# Patient Record
Sex: FEMALE | Race: WHITE
[De-identification: names, ages, dates, MRNs, and addresses within clinical notes are randomized per-mention and may not be internally consistent; named-entity substitution may affect disease eponyms.]

---

## 2018-01-10 ENCOUNTER — HOSPITAL ENCOUNTER (INPATIENT)
Dept: HOSPITAL 42 - ED | Age: 58
LOS: 3 days | Discharge: HOME | DRG: 552 | End: 2018-01-13
Attending: INTERNAL MEDICINE | Admitting: INTERNAL MEDICINE
Payer: COMMERCIAL

## 2018-01-10 DIAGNOSIS — F41.9: ICD-10-CM

## 2018-01-10 DIAGNOSIS — K76.0: ICD-10-CM

## 2018-01-10 DIAGNOSIS — G44.209: ICD-10-CM

## 2018-01-10 DIAGNOSIS — M54.81: Primary | ICD-10-CM

## 2018-01-10 DIAGNOSIS — G43.909: ICD-10-CM

## 2018-01-10 DIAGNOSIS — E87.6: ICD-10-CM

## 2018-01-10 DIAGNOSIS — Z90.49: ICD-10-CM

## 2018-01-10 DIAGNOSIS — I65.23: ICD-10-CM

## 2018-01-10 DIAGNOSIS — G89.29: ICD-10-CM

## 2018-01-10 DIAGNOSIS — M79.7: ICD-10-CM

## 2018-01-10 DIAGNOSIS — R55: ICD-10-CM

## 2018-01-10 DIAGNOSIS — R42: ICD-10-CM

## 2018-01-10 DIAGNOSIS — I10: ICD-10-CM

## 2018-01-10 DIAGNOSIS — Z98.1: ICD-10-CM

## 2018-01-10 DIAGNOSIS — Z90.710: ICD-10-CM

## 2018-01-10 DIAGNOSIS — M54.2: ICD-10-CM

## 2018-01-10 DIAGNOSIS — E78.5: ICD-10-CM

## 2018-01-10 DIAGNOSIS — E66.9: ICD-10-CM

## 2018-01-10 LAB
ALBUMIN SERPL-MCNC: 4.6 G/DL (ref 3–4.8)
ALBUMIN/GLOB SERPL: 1.4 {RATIO} (ref 1.1–1.8)
ALT SERPL-CCNC: 71 U/L (ref 7–56)
APPEARANCE UR: (no result)
APTT BLD: 30.8 SECONDS (ref 25.1–36.5)
AST SERPL-CCNC: 60 U/L (ref 14–36)
BACTERIA #/AREA URNS HPF: (no result) /[HPF]
BASOPHILS # BLD AUTO: 0.04 K/MM3 (ref 0–2)
BASOPHILS NFR BLD: 0.6 % (ref 0–3)
BILIRUB UR-MCNC: NEGATIVE MG/DL
BUN SERPL-MCNC: 13 MG/DL (ref 7–21)
CALCIUM SERPL-MCNC: 10.1 MG/DL (ref 8.4–10.5)
CK MB SERPL-MCNC: 0.8 NG/ML (ref 0–3.6)
COLOR UR: YELLOW
EOSINOPHIL # BLD: 0.1 10*3/UL (ref 0–0.7)
EOSINOPHIL NFR BLD: 2 % (ref 1.5–5)
ERYTHROCYTE [DISTWIDTH] IN BLOOD BY AUTOMATED COUNT: 13.8 % (ref 11.5–14.5)
GFR NON-AFRICAN AMERICAN: > 60
GLUCOSE UR STRIP-MCNC: NEGATIVE MG/DL
GRANULOCYTES # BLD: 3.94 10*3/UL (ref 1.4–6.5)
GRANULOCYTES NFR BLD: 61 % (ref 50–68)
HGB BLD-MCNC: 14.3 G/DL (ref 12–16)
INR PPP: 0.99 (ref 0.93–1.08)
LEUKOCYTE ESTERASE UR-ACNC: NEGATIVE LEU/UL
LYMPHOCYTES # BLD: 1.9 10*3/UL (ref 1.2–3.4)
LYMPHOCYTES NFR BLD AUTO: 29.3 % (ref 22–35)
MCH RBC QN AUTO: 32.8 PG (ref 25–35)
MCHC RBC AUTO-ENTMCNC: 33.2 G/DL (ref 31–37)
MCV RBC AUTO: 98.9 FL (ref 80–105)
MONOCYTES # BLD AUTO: 0.5 10*3/UL (ref 0.1–0.6)
MONOCYTES NFR BLD: 7.1 % (ref 1–6)
PH UR STRIP: 7 [PH] (ref 4.7–8)
PLATELET # BLD: 297 10^3/UL (ref 120–450)
PMV BLD AUTO: 9 FL (ref 7–11)
PROT UR STRIP-MCNC: NEGATIVE MG/DL
PROTHROMBIN TIME: 11.4 SECONDS (ref 9.4–12.5)
RBC # BLD AUTO: 4.36 10^6/UL (ref 3.5–6.1)
RBC # UR STRIP: (no result) /UL
SP GR UR STRIP: 1.01 (ref 1–1.03)
TROPONIN I SERPL-MCNC: < 0.01 NG/ML
TROPONIN I SERPL-MCNC: < 0.01 NG/ML
URINE NITRATE: NEGATIVE
UROBILINOGEN UR STRIP-ACNC: 0.2 E.U./DL
WBC # BLD AUTO: 6.5 10^3/UL (ref 4.5–11)
WBC #/AREA URNS HPF: (no result) /HPF (ref 0–6)

## 2018-01-10 RX ADMIN — BUTALBITAL, ACETAMINOPHEN, AND CAFFEINE PRN TAB: 50; 325; 40 TABLET ORAL at 23:00

## 2018-01-10 NOTE — CT
PROCEDURE:  CT HEAD WITHOUT CONTRAST.



HISTORY:

Headache, hypertension 



COMPARISON:

None available. 



TECHNIQUE:

Axial computed tomography images were obtained through the head/brain 

without intravenous contrast.  



Coronal and sagittal reconstructed images.



Radiation dose:



Total exam DLP = 850.76 mGy-cm.



This CT exam was performed using one or more of the following dose 

reduction techniques: Automated exposure control, adjustment of the 

mA and/or kV according to patient size, and/or use of iterative 

reconstruction technique.



FINDINGS:



HEMORRHAGE:

No intracranial hemorrhage. 



BRAIN:

No mass effect or edema.  No atrophy or chronic microvascular 

ischemic changes.



VENTRICLES:

Unremarkable. No hydrocephalus. 



CALVARIUM:

Unremarkable.



PARANASAL SINUSES:

Unremarkable as visualized. No significant inflammatory changes.



MASTOID AIR CELLS:

Unremarkable as visualized. No inflammatory changes.



OTHER FINDINGS:

None.



IMPRESSION:

No acute intracranial abnormalities. No significant findings to 

account for the clinical presentation.

## 2018-01-10 NOTE — CP.PCM.HP
<Joe Finnegan - Last Filed: 01/11/18 05:11>





History of Present Illness





- History of Present Illness


History of Present Illness: 





Chief Complaint: Headaches, generalized weakness


HPI: Patient is a 57 year old female with past medical history of chronic 

migraines, hypotension presenting to [facility] emergency 

department with complaints of generalized weakness, headaches, shortness of 

breath, headaches, and epigastric pain. Patient states that the headache 

started about a month ago and that they are significantly different from the 

migraine she experiences. Patient states when she has migraines they are 

associated with diarrhea vomiting, photophobia and sweating.Patient states 

these headaches are different because they start from the left side of her neck 

and wrap her forehead with a focal point especially above the left eyebrow 

patient states headaches are also associated with her blurry vision and are not 

associated with nausea, diarrhea and photophobia. Pain is rated a seven out of 

ten, exacerbated with laying down and relieved by sitting up.Patient also 

endorses one month of shortness of breath with minimal exertion such as walking 

up the stairs to her house; this has never happened in the past. Patient's 

blood pressure is the main reason she came to the emergency department.  

Patient states her blood pressure has been noticeably increased stating that in 

the past week her readings for systolic blood pressure has been in the 160 to 

180s; this is significant per patient due to the fact that she normally suffers 

from hypotension. While being admitted to the ED patient experienced a syncopal 

episode. Patient did not experience any trauma to this head at this time; code 

star was called. Patient denies fevers, chills, cough. 





PMD: formerly Dr. Lyons


Past surgical history: Cholecystectomy, hysterectomy s/p Stage IV endometriosis

, Fusion L4-L5 , endoscopy by Dr. Humphreys


Family History: Mother- Breast cancer, Father- Myocardial infarction


Past Medical history: chronic fatigue syndrome, chronic migraines, hypotension


Social History: denies smoking, admits to social alcohol consumption, denies 

illicit drug use


Medications: For migraines: Relpax 40 mg as needed, Allergies: esloratidine 5 

mg daily, CFS/ME: valacyclovir 2 mg daily, memantine 5 mg daily, nalyrexone 1 

mg daily, modafenil 100 mg daily, glutathione 50 mg weekly, Pain and 

fibromyalgia: cyclobenzaprine 5 mg nightly, celecoxib 200 mg twice a day, 

Vitamins and supplements: essential blend probiotic, magnesium, multivitamin, 

vitamin D3, Co-Q, phosphatidyl serine, inosine, tumeric curcumin, melatonin





Labs on admission: wbc 6.5, hemoglobin 14.3, hematocrit 43.1, platelets 297, 

sodium 143, potassium 4.1, chloride 101, bicarbonate 31, BUN 13, creatinine 0.7

, AST 60, ALT 71, creatine kinase 263, troponins negative x2


Chest x-ray: no active disease


CT head: no intracranial hemorrhage


MRI brain without contrast: scattered foci of high FLAIR signal intensity 

within the cerebral matter. No mass effect or reestricted diffusion associated 

with these foci; suggestive of chronic small vessel ischemic disease, 

demyelination within differential. Small atrophy of frontal sulci. 

Calcification of posterior right frontal lobe; hemosiderin within differential, 

mild ventriculomegaly, no cerebral edema


Abdominal ultrasound: fatty infiltration of liver, hepatic cysts; largest in 

right lobe 2.3 x 3.1. 











Present on Admission





- Present on Admission


Any Indicators Present on Admission: No





Review of Systems





- Review of Systems


Systems not reviewed;Unavailable: Acuity of Condition





- Constitutional


Constitutional: Headache





- EENT


Eyes: Blurred Vision.  absent: Tunnel Vision





- Cardiovascular


Cardiovascular: Dyspnea on Exertion, Syncope





- Respiratory


Respiratory: Dyspnea.  absent: Wheezing





- Gastrointestinal


Gastrointestinal: Abdominal Pain.  absent: Diarrhea





- Genitourinary


Genitourinary: absent: Dysuria, Flank Pain





- Musculoskeletal


Musculoskeletal: Back Pain





- Neurological


Neurological: Dizziness, Syncope, Weakness





- Endocrine


Endocrine: Fatigue





Past Patient History





- Past Social History


Smoking Status: Never Smoked





- NEUROLOGICAL


Hx Neurological Disorder: Yes


Other/Comment: Fibromyalgia, chronic fatigue





- PSYCHIATRIC


Hx Substance Use: No





- SURGICAL HISTORY


Hx Hysterectomy: Yes


Other/Comment: Rt. Knee, Rt. Shoulder





Meds


Allergies/Adverse Reactions: 


 Allergies











Allergy/AdvReac Type Severity Reaction Status Date / Time


 


No Known Allergies Allergy   Verified 01/10/18 20:53














Physical Exam





- Head Exam


Head Exam: ATRAUMATIC, NORMAL INSPECTION, NORMOCEPHALIC





- Eye Exam


Eye Exam: EOMI, Normal appearance





- ENT Exam


ENT Exam: Mucous Membranes Moist, Normal Exam





- Neck Exam


Neck exam: Positive for: Normal Inspection





- Respiratory Exam


Respiratory Exam: Clear to Auscultation Bilateral, NORMAL BREATHING PATTERN





- Cardiovascular Exam


Cardiovascular Exam: REGULAR RHYTHM, +S1, +S2





- GI/Abdominal Exam


GI & Abdominal Exam: Normal Bowel Sounds, Soft, Tenderness (right upper 

quadrant and right lower quadrant)





- Extremities Exam


Extremities exam: Positive for: normal inspection, pedal edema (bilateral)





- Back Exam


Back exam: NORMAL INSPECTION





- Neurological Exam


Neurological exam: Alert, CN II-XII Intact, Oriented x3





- Psychiatric Exam


Psychiatric exam: Normal Affect, Normal Mood





- Skin


Skin Exam: Intact, Normal Color, Warm





Results





- Vital Signs


Recent Vital Signs: 





 Last Vital Signs











Temp  97.6 F   01/10/18 14:19


 


Pulse  78   01/10/18 14:19


 


Resp  18   01/10/18 14:19


 


BP  149/98 H  01/10/18 14:19


 


Pulse Ox  99   01/10/18 14:19














- Labs


Result Diagrams: 


 01/10/18 13:23





 01/10/18 13:23


Labs: 





 Laboratory Results - last 24 hr











  01/10/18 01/10/18 01/10/18





  13:23 13:23 13:23


 


WBC  6.5  


 


RBC  4.36  


 


Hgb  14.3  


 


Hct  43.1  


 


MCV  98.9  


 


MCH  32.8  


 


MCHC  33.2  


 


RDW  13.8  


 


Plt Count  297  


 


MPV  9.0  


 


Gran %  61.0  


 


Lymph % (Auto)  29.3  


 


Mono % (Auto)  7.1 H  


 


Eos % (Auto)  2.0  


 


Baso % (Auto)  0.6  


 


Gran #  3.94  


 


Lymph #  1.9  


 


Mono #  0.5  


 


Eos #  0.1  


 


Baso #  0.04  


 


PT   11.4 


 


INR   0.99 


 


APTT   30.8 


 


Sodium    143


 


Potassium    4.1


 


Chloride    101


 


Carbon Dioxide    31


 


Anion Gap    15


 


BUN    13


 


Creatinine    0.7


 


Est GFR ( Amer)    > 60


 


Est GFR (Non-Af Amer)    > 60


 


Random Glucose    107


 


Calcium    10.1


 


Total Bilirubin    0.4


 


AST    60 H


 


ALT    71 H


 


Alkaline Phosphatase    104


 


Lactate Dehydrogenase    591


 


Total Creatine Kinase    263 H


 


CK-MB (CK-2)    0.8


 


CK-MB (CK-2) %    Cancelled


 


Troponin I    < 0.01


 


Total Protein    7.7


 


Albumin    4.6


 


Globulin    3.2


 


Albumin/Globulin Ratio    1.4














Assessment & Plan





- Assessment and Plan (Free Text)


Assessment: 





Patient is a 57 year old female with past medical history of chronic migraines, 

hypotension presenting to [facility] emergency department with 

complaints of generalized weakness, headaches, shortness of breath, headaches, 

and epigastric pain.


Plan: 





Syncope r/o ACS versus migraine versus vasovagal versus orthostasis


- Troponin negative x 2


- EKG; normal sinus rhythm 


- carotid doppler


- echocardiogram


- orthostatics


- CBC, CMP, TSH, fasting lipid panel, HgA1C


- Cardiology consult; recommendations appreciated


- Neurology consult; recommendations appreciated


- neuro checks


Migraines


- Fiorocet; continue to monitor


- Regular diet; patient hasn't eaten. Mild stressed with seemster starting again


Hypertension


- Continue to monitor; normalized after fiorocet was administered and eating


Deep vein thrombosis/Gastrointestinal prophylaxis


SCDs and heparin drip/Protonix





<Davis Flynn - Last Filed: 01/12/18 15:47>





Results





- Vital Signs


Recent Vital Signs: 





 Last Vital Signs











Temp  98.8 F   01/11/18 19:06


 


Pulse  105 H  01/11/18 19:06


 


Resp  20   01/11/18 19:06


 


BP  159/101 H  01/11/18 19:06


 


Pulse Ox  98   01/11/18 06:00














- Labs


Result Diagrams: 


 01/12/18 06:30





 01/12/18 06:30


Labs: 





 Laboratory Results - last 24 hr











  01/12/18 01/12/18





  06:30 06:30


 


WBC  12.1 H D 


 


RBC  4.04 


 


Hgb  13.1 


 


Hct  39.3 


 


MCV  97.3 


 


MCH  32.4 


 


MCHC  33.3 


 


RDW  14.0 


 


Plt Count  312 


 


MPV  9.0 


 


Gran %  78.7 H 


 


Lymph % (Auto)  15.6 L 


 


Mono % (Auto)  5.6 


 


Eos % (Auto)  0.0 L 


 


Baso % (Auto)  0.1 


 


Gran #  9.48 H 


 


Lymph #  1.9 


 


Mono #  0.7 H 


 


Eos #  0.0 


 


Baso #  0.01 


 


Sodium   140


 


Potassium   3.9


 


Chloride   107


 


Carbon Dioxide   22


 


Anion Gap   15


 


BUN   13


 


Creatinine   0.6 L


 


Est GFR ( Amer)   > 60


 


Est GFR (Non-Af Amer)   > 60


 


Random Glucose   140 H


 


Calcium   9.4


 


Total Bilirubin   0.3


 


AST   39 H D


 


ALT   55


 


Alkaline Phosphatase   101


 


Total Protein   7.0


 


Albumin   4.0


 


Globulin   3.0


 


Albumin/Globulin Ratio   1.3














Attending/Attestation





- Attestation


I have personally seen and examined this patient.: Yes


I have fully participated in the care of the patient.: Yes


I have reviewed all pertinent clinical information: Yes


Notes (Text): 





01/12/18 15:22





Attending note;





Patient seen and examined with resident in ER.





Patient is a 57 year old female with past medical history of chronic fatigue 

syndrome , history of epidural injections , occipital nerve block, chronic 

migraines who presented with complaint of headache, high blood pressure and   

pre syncopal episode.


CT and MRI brain with significant ischemia.


Echocardiogram normal. Carotid Doppler normal.  Neurology and cardiology 

evaluation appreciated.





Hypertension; not on blood pressure medication at home. Monitor blood pressure 

closely.


Start medication as needed.





continue flexaril and topamax.





Mild transaminitis noted; abdominal ultrasound showed fatty liver.


PT evaluation requested.





Possible discharge home tomorrow if PT clears  the patient.








01/12/18 15:46

## 2018-01-10 NOTE — MRI
EXAM:

  MR Head Without Intravenous Contrast



EXAM DATE/TIME:

  1/10/2018 6:21 PM



CLINICAL HISTORY:

  The patient age is 57 years old and is female; Signs and symptoms; Dizziness 

and other: HA; Additional info: Headache with blurry vision Facility exam id 

and description: Mri br s brain without contrast



TECHNIQUE:

  Magnetic resonance images of the head/brain without intravenous contrast in 

multiple planes.



COMPARISON:

  CT - HEAD W/O CONTRAST 2018-01-10 14:02



FINDINGS:

  Brain:  There is no restricted diffusion within the brain to suggest acute 

ischemic change.  There are scattered foci of high FLAIR signal intensity 

within the cerebral white matter.  There is no mass effect or restricted 

diffusion associated with these foci. This is suggestive of chronic small 

vessel ischemic disease.  Demyelination is within the differential.  There is 

mild atrophy of the frontal sulci.  There is a small focus of magnetic 

susceptibility involving the posterior right frontal lobe, likely representing 

calcification when correlated with the previous CT. Hemosiderin is within the 

differential, but considered less likely. No cerebral edema.

  Ventricles:  There is mild ventriculomegaly.

  Bones/joints:  There is slight anterolisthesis of C3 on C4.

  Sinuses:  Unremarkable as visualized.  No acute sinusitis.

  Mastoid air cells:  No mastoid effusion.

  Orbits:  No acute abnormality, as visualized.



IMPRESSION:     

1.  There is no restricted diffusion within the brain to suggest acute ischemic 

change.

2.  There are scattered foci of high FLAIR signal intensity within the cerebral 

white matter.  This is suggestive of chronic small vessel ischemic disease.  

Demyelination is within the differential.

3.  There is mild atrophy of the frontal sulci.

4.  There is mild ventriculomegaly.

5.  Additional findings described above.

## 2018-01-10 NOTE — ED PDOC
Arrival/HPI





- General


Chief Complaint: High Blood Pressure


Time Seen by Provider: 01/10/18 12:46


Historian: Patient





- History of Present Illness


Narrative History of Present Illness (Text): 





01/10/18 13:49


Patient is a 56 yo female presents to the Emergency Department with history of 

headaches intermittently and "not feeling well" for the past two days but also 

admits "possibly longer than that". Patient notes that she was noted to have 

elevated blood pressure, she denies prior history of this. Patient states 

headaches generalized, not sudden onset. No associated photopobia or neck pain. 

Also states that over past two days she has been short of breath with exertion, 

when she walks up steps she also experiences chest discomfort. At rest, denies 

chest pain. States headache currently persistent but somewhat improved than 

earlier. She reports that blood pressure was "180" prior to arrival. She 

reports that while getting triaged she "almost passed out", reports dizziness 

and lightheadedness while getting registered.


Time/Duration: Prior to Arrival, < week


Symptom Onset: Gradual





Past Medical History





- Neurological


Hx Neurological Disorder: Yes


Other/Comment: Fibromyalgia, chronic fatigue





- Psychiatric


Hx Substance Use: No





- Surgical History


Hx Hysterectomy: Yes


Other/Comment: Rt. Knee, Rt. Shoulder





Family/Social History


Family/Social History: CAD/MI


Smoking Status: Never Smoked


Hx Alcohol Use: No


Hx Substance Use: No





Allergies/Home Meds


Allergies/Adverse Reactions: 


Allergies





No Known Allergies Allergy (Verified 01/10/18 20:53)


 








Home Medications: 


 Home Meds











 Medication  Instructions  Recorded  Confirmed


 


Unobtainable  01/10/18 01/10/18














Review of Systems





- Review of Systems


Constitutional: Fatigue.  absent: Fevers


Eyes: Other (intermittent blurred vision).  absent: Vision Changes, Eye Pain


ENT: absent: Hearing Changes, Sore Throat, Rhinorrhea


Respiratory: absent: SOB


Cardiovascular: Chest Pain, YOUNG.  absent: Edema, Calf Pain


Gastrointestinal: absent: Abdominal Pain, Nausea, Vomiting


Genitourinary Female: absent: Dysuria


Musculoskeletal: Back Pain


Skin: absent: Rash


Neurological: Headache, Dizziness.  absent: Focal Weakness


Endocrine: absent: Polyuria


Hemo/Lymphatic: absent: Easy Bleeding


Psychiatric: absent: Depression





Physical Exam





- Physical Exam


Narrative Physical Exam (Text): 


Head:  Atraumatic.  Normocephalic. 


Eyes:  PERRL.  EOMI.  Conjunctivae are not pale. No pain with eye movements. 

Visual acuity and fields grossly intact.


ENT:  Mucous membranes are moist and intact.  Oropharynx is clear and 

symmetric. 


Neck:  Supple.  Full ROM.  No JVD.  No lymphadenopathy.


Cardiovascular:  Regular rate.  Regular rhythm.  No murmurs, rubs, or gallops.  

Distal pulses are 2+ and symmetric.


Pulmonary/Chest:  No evidence of respiratory distress.  Clear to auscultation 

bilaterally.  No wheezing, rales or rhonchi.


Abdominal:  Soft and non-distended.  Mild epigastric pain, negative Byrd's 

sign.  No rebound, guarding, or rigidity.  No organomegaly.  Good bowel sounds. 


Back:  No CVA tenderness.


Extremities:  No edema.   No cyanosis.  No clubbing.  Full range of motion in 

all extremities.  No calf tenderness.


Skin:  Skin is warm and dry.  No petechiae.  No purpura. 


Neurological:  Alert, awake, and oriented. Motor and sensory exam intact with 

no slurred speech or facial droop. Normal gait. No meningeal signs.


Psychiatric:  Good eye contact.  Normal interaction, affect, and behavior.








01/11/18 11:13





Vital Signs Reviewed: Yes


Vital Signs











  Temp Pulse Pulse Pulse Resp BP Pulse Ox


 


 01/10/18 20:52   83  82  82  19  162/95 H 


 


 01/10/18 20:15  97.6 F  87    20  119/54 L  98


 


 01/10/18 18:10   89    16  155/101 H  98


 


 01/10/18 18:07   86    17  170/109 H  98


 


 01/10/18 14:19  97.6 F  78    18  149/98 H  99


 


 01/10/18 13:53   82    18  146/98 H  99


 


 01/10/18 12:47   99 H    18  149/86  100


 


 01/10/18 11:33  97.7 F  105 H    18  172/117 H  100











Temperature: Afebrile


Blood Pressure: Hypertensive


Appearance: Positive for: Uncomfortable


Pain Distress: Moderate


Mental Status: Positive for: Alert and Oriented X 3


Finger Stick Blood Glucose: 99





Medical Decision Making


ED Course and Treatment: 





01/10/18 16:35


Patient is a 56 yo female, DENIES prior history of hypertension, presents with 

"not feeling well", and patient has had chest pain and dyspnea with exertion.


She has had prior cardiac cath which reportedly was unremarkable. She reports 

significant past family cardiac history. Patient is noted to be hypertensive in 

ED, reportedly found to be more elevated prior to arrival. She reports that her 

"blood pressure is usually low". Her current neuro exam is intact. She reports 

near syncopal event prior to being placed on stretcher. Currently no 

hypotension or tachycardia, no fever. She reports feeling dizzy when she lays 

down. No calf pain or pleuritic pain. There is mild epigastric pain noted on re-

exam, LFTs mildly elevated when compared to prior. Cannot exclude GI component 

of symptoms, although given HTN and chest pain/dyspnea with exertion, possible 

multifactorial source of her symptoms, will admit for cardiac monitoring and 

serial neuro exams. Suspect headaches may be related to hypertension, as 

headache improved with BP improved, although given trending of persistently 

elevated BP by her report over past week will consider underlying neuro 

etiology as well if symptoms persistent.





Case d/w hospitalist, covering for patient's PMD.











- Lab Interpretations


Lab Results: 








 01/10/18 13:23 





 01/10/18 13:23 





 Lab Results





01/10/18 13:23: Sodium 143, Potassium 4.1, Chloride 101, Carbon Dioxide 31, 

Anion Gap 15, BUN 13, Creatinine 0.7, Est GFR (African Amer) > 60, Est GFR (Non-

Af Amer) > 60, Random Glucose 107, Calcium 10.1, Total Bilirubin 0.4, AST 60 H, 

ALT 71 H, Alkaline Phosphatase 104, Lactate Dehydrogenase 591, Total Creatine 

Kinase 263 H, CK-MB (CK-2) 0.8, CK-MB (CK-2) % Cancelled, Troponin I < 0.01, 

Total Protein 7.7, Albumin 4.6, Globulin 3.2, Albumin/Globulin Ratio 1.4


01/10/18 13:23: PT 11.4, INR 0.99, APTT 30.8


01/10/18 13:23: WBC 6.5, RBC 4.36, Hgb 14.3, Hct 43.1, MCV 98.9, MCH 32.8, MCHC 

33.2, RDW 13.8, Plt Count 297, MPV 9.0, Gran % 61.0, Lymph % (Auto) 29.3, Mono 

% (Auto) 7.1 H, Eos % (Auto) 2.0, Baso % (Auto) 0.6, Gran # 3.94, Lymph # 1.9, 

Mono # 0.5, Eos # 0.1, Baso # 0.04


01/10/18 13:21: POC Glucose (mg/dL) 102











- RAD Interpretation


Radiology Orders: 








01/10/18 13:01


HEAD W/O CONTRAST [CT] Stat 


CHEST PORTABLE [RAD] Stat 





01/10/18 14:54


ABDOMEN COMPLETE [US] Stat 











: Radiologist





- EKG Interpretation


EKG Interpretation (Text): 





01/10/18 16:41


EKG normal sinus rhythm rate of 84, moderate voltage criteria for LVH


Interpreted by ED Physician: Yes


Type: 12 lead EKG





- Medication Orders


Current Medication Orders: 








Aspirin (Aspirin Chewable)  81 mg PO DAILY [facility]


Atorvastatin Calcium (Lipitor)  40 mg PO DIN [facility]


Heparin Sodium (Porcine) (Heparin)  5,000 units SC Q12 [facility]


   PRN Reason: Protocol


Sodium Chloride (Sodium Chloride 0.9%)  1,000 mls @ 100 mls/hr IV .Q10H [facility]


Loratadine (Claritin)  10 mg PO DAILY [facility]


   Last Admin: 01/10/18 18:12  Dose: 10 mg





Magnesium Oxide (Mag-Ox)  400 mg PO BID [facility]


Pantoprazole Sodium (Protonix Inj)  40 mg IVP Q12 [facility]


   Last Admin: 01/10/18 22:51  Dose: 40 mg





IVP Administration


 Document     01/10/18 22:51  ST  (Rec: 01/10/18 22:51  ST  [place] [id number])


     Charges for Administration


      # of IVP Administrations                   1





Sodium Chloride (Ocean Nasal Spray)  2 ml NS BID PRN


   PRN Reason: Nasal congestion





Discontinued Medications





Acetaminophen/Butalbital/Caffeine (Fioricet)  1 tab PO ONCE STA


   Stop: 01/10/18 17:04


   Last Admin: 01/10/18 18:12  Dose: 1 tab





MAR Pain Assessment


 Document     01/10/18 18:12  OCS  (Rec: 01/10/18 18:12  OCS  [id number])


     Pain Reassessment


      Is this a pain reassessment?               Yes


     Sleep


      Is patient sleeping during reassessment?   No


     Presence of Pain


      Presence of Pain                           Yes


Re-Assess: MAR Pain Assessment


 Document     01/10/18 19:12  ST  (Rec: 01/10/18 22:56  ST  [place] [id number])


     Pain Reassessment


      Is this a pain reassessment?               Yes


     Sleep


      Is patient sleeping during reassessment?   No


     Description


      Description                                Pressure


      Intensity of Pain at present               2





Acetaminophen/Butalbital/Caffeine (Fioricet)  1 tab PO Q6H PRN


   PRN Reason: Pain, moderate (4-7)


   Last Admin: 01/11/18 06:14  Dose: 1 tab





MAR Pain Assessment


 Document     01/11/18 06:14  ST  (Rec: 01/11/18 06:15  ST  [id number])


     Pain Reassessment


      Is this a pain reassessment?               No


     Sleep


      Is patient sleeping during reassessment?   No


     Presence of Pain


      Presence of Pain                           Yes


     Pain Scale Used


      Pain Scale Used                            Numeric


     Location


      Left, Right or Bilateral                   Left


      Pain Location Body Site                    Head


     Description


      Description                                Constant


      Intensity of Pain at present               9


Re-Assess: MAR Pain Assessment


 Document     01/11/18 07:14  ST  (Rec: 01/11/18 08:23  ST  [id number])


     Pain Reassessment


      Is this a pain reassessment?               Yes


     Sleep


      Is patient sleeping during reassessment?   No


     Presence of Pain


      Presence of Pain                           Yes


     Pain Scale Used


      Pain Scale Used                            Numeric


     Location


      Pain Location Body Site                    Head


     Description


      Description                                Constant


      Intensity of Pain at present               8





Dexamethasone (Decadron Inj)  10 mg IVP ONCE ONE


   Stop: 01/11/18 08:07


   Last Admin: 01/11/18 09:07  Dose: 10 mg





IVP Administration


 Document     01/11/18 09:07  CD  (Rec: 01/11/18 09:07  CD  [id number])


     Charges for Administration


      # of IVP Administrations                   1





Diphenhydramine HCl (Benadryl)  25 mg PO ONCE STA


   Stop: 01/11/18 09:18


   Last Admin: 01/11/18 09:21  Dose: 25 mg





Famotidine (Pepcid)  20 mg IVP STAT [facility]


   Stop: 01/10/18 15:05


   Last Admin: 01/10/18 18:12  Dose: 20 mg





IVP Administration


 Document     01/10/18 18:12  OCS  (Rec: 01/10/18 18:12  OCS  [id number])


     Charges for Administration


      # of IVP Administrations                   1





Valproate Sodium 500 mg/ (Sodium Chloride)  105 mls @ 100 mls/hr IVPB ONCE ONE


   Stop: 01/11/18 08:43


Potassium Chloride (K-Dur 20 Meq Er Tab)  20 meq PO ONCE ONE


   Stop: 01/11/18 09:24











Disposition/Present on Arrival





- Present on Arrival


Any Indicators Present on Arrival: No


History of DVT/PE: No


History of Uncontrolled Diabetes: No


Urinary Catheter: No


History of Decub. Ulcer: No


History Surgical Site Infection Following: None





- Disposition


Have Diagnosis and Disposition been Completed?: Yes


Diagnosis: 


 Hypertension, Dizziness, Headache, Chest pain, Near syncope





Disposition: HOSPITALIZED


Disposition Time: 14:00


Patient Plan: Admission, Telemetry


Patient Problems: 


 Current Active Problems











Problem Status Onset


 


Chest pain Acute  


 


Dizziness Acute  


 


Headache Acute  


 


Hypertension Acute  











Condition: FAIR

## 2018-01-10 NOTE — US
HISTORY:

upper abdominal pain



COMPARISON:

None.



TECHNIQUE:

Sonographic evaluation of the abdomen.



FINDINGS:



LIVER:

Measures 15.9 cm.  Hepatopedal blood flow. Fatty infiltration 

manifest ultrasonographically as increased echogenicity of the liver 

parenchyma. No mass. No intrahepatic bile duct dilatation.Incidental 

finding(s): Hepatic cysts (2) the largest in the right hepatic lobe 

2.3 x 3.1 cm 



GALLBLADDER:

Unremarkable. No gallstones.



COMMON BILE DUCT:

Measures 4.5 mm. No stones. No dilatation.



PANCREAS:

Unremarkable as visualized. No mass. No ductal dilatation.



RIGHT KIDNEY:

Measures cm. Normal echogenicity. No calculus, mass, or 

hydronephrosis.



LEFT KIDNEY:

Measures cm. Normal echogenicity. No calculus, mass, or 

hydronephrosis.



SPLEEN:

Normal in size and contour. No mass.



AORTA:

No aneurysmal dilatation. 



IVC:

Unremarkable. 



OTHER FINDINGS:

None. 



IMPRESSION:

No acute findings related to/accounting  for the clinical 

presentation. Additional benign and/or incidental findings described 

above.

## 2018-01-10 NOTE — RAD
HISTORY:

Syncope



COMPARISON:

08/13/2014. 



FINDINGS:



LUNGS:

No active pulmonary disease.



PLEURA:

No significant pleural effusion identified, no pneumothorax apparent.



CARDIOVASCULAR:

 No radiographic findings to suggest acute or significant 

cardiovascular disease.



OSSEOUS STRUCTURES:

No significant abnormalities.



VISUALIZED UPPER ABDOMEN:

Normal.



OTHER FINDINGS:

None.



IMPRESSION:

No active disease. No significant interval change compared to the 

prior examination(s).

## 2018-01-11 LAB
ALBUMIN SERPL-MCNC: 4.1 G/DL (ref 3–4.8)
ALBUMIN/GLOB SERPL: 1.4 {RATIO} (ref 1.1–1.8)
ALT SERPL-CCNC: 64 U/L (ref 7–56)
AST SERPL-CCNC: 49 U/L (ref 14–36)
BASOPHILS # BLD AUTO: 0.06 K/MM3 (ref 0–2)
BASOPHILS NFR BLD: 0.9 % (ref 0–3)
BUN SERPL-MCNC: 14 MG/DL (ref 7–21)
CALCIUM SERPL-MCNC: 9.3 MG/DL (ref 8.4–10.5)
EOSINOPHIL # BLD: 0.2 10*3/UL (ref 0–0.7)
EOSINOPHIL NFR BLD: 3.1 % (ref 1.5–5)
ERYTHROCYTE [DISTWIDTH] IN BLOOD BY AUTOMATED COUNT: 14.1 % (ref 11.5–14.5)
GFR NON-AFRICAN AMERICAN: > 60
GRANULOCYTES # BLD: 2.64 10*3/UL (ref 1.4–6.5)
GRANULOCYTES NFR BLD: 40.9 % (ref 50–68)
HDLC SERPL-MCNC: 55 MG/DL (ref 29–60)
HGB BLD-MCNC: 13.4 G/DL (ref 12–16)
LDLC SERPL-MCNC: 119 MG/DL (ref 0–129)
LYMPHOCYTES # BLD: 3 10*3/UL (ref 1.2–3.4)
LYMPHOCYTES NFR BLD AUTO: 46.6 % (ref 22–35)
MAGNESIUM SERPL-MCNC: 2 MG/DL (ref 1.7–2.2)
MCH RBC QN AUTO: 32.4 PG (ref 25–35)
MCHC RBC AUTO-ENTMCNC: 32.8 G/DL (ref 31–37)
MCV RBC AUTO: 98.8 FL (ref 80–105)
MONOCYTES # BLD AUTO: 0.6 10*3/UL (ref 0.1–0.6)
MONOCYTES NFR BLD: 8.5 % (ref 1–6)
PLATELET # BLD: 288 10^3/UL (ref 120–450)
PMV BLD AUTO: 9 FL (ref 7–11)
RBC # BLD AUTO: 4.14 10^6/UL (ref 3.5–6.1)
TROPONIN I SERPL-MCNC: < 0.01 NG/ML
WBC # BLD AUTO: 6.5 10^3/UL (ref 4.5–11)

## 2018-01-11 RX ADMIN — PANTOPRAZOLE SODIUM SCH MG: 40 TABLET, DELAYED RELEASE ORAL at 22:32

## 2018-01-11 RX ADMIN — BUTALBITAL, ACETAMINOPHEN, AND CAFFEINE PRN TAB: 50; 325; 40 TABLET ORAL at 06:14

## 2018-01-11 NOTE — CP.PCM.PN
<Joe Finnegan - Last Filed: 01/11/18 13:17>





Subjective





- Date & Time of Evaluation


Date of Evaluation: 01/11/18


Time of Evaluation: 06:30





- Subjective


Subjective: 





Patient seen and examined at bedside in no acute distress. States her headaches 

have returned. Admits to fiorocet and meal improving headache but stating it 

only relieved her headache for an hour. Patient states blurry vision still 

continues despite using her glasses. Admits to dizziness when standing up and 

walking. Has difficulty ambulating on her own. Denies abdominal pain, fevers, 

chills, nausea, vomiting, diarrhea, photophobia. 





Objective





- Vital Signs/Intake and Output


Vital Signs (last 24 hours): 


 











Temp Pulse Resp BP Pulse Ox


 


 97.4 F L  86   20   156/95 H  98 


 


 01/11/18 12:00  01/11/18 12:00  01/11/18 12:00  01/11/18 12:00  01/11/18 06:00








Intake and Output: 


 











 01/11/18 01/11/18





 06:59 18:59


 


Intake Total 300 240


 


Balance 300 240














- Medications


Medications: 


 Current Medications





Aspirin (Aspirin Chewable)  81 mg PO DAILY [facility]


   Last Admin: 01/11/18 12:06 Dose:  81 mg


Atorvastatin Calcium (Lipitor)  40 mg PO DIN [facility]


Heparin Sodium (Porcine) (Heparin)  5,000 units SC Q12 [facility]


   PRN Reason: Protocol


   Last Admin: 01/11/18 12:05 Dose:  5,000 units


Sodium Chloride (Sodium Chloride 0.9%)  1,000 mls @ 100 mls/hr IV .Q10H [facility]


   Last Admin: 01/11/18 12:38 Dose:  100 mls/hr


Loratadine (Claritin)  10 mg PO DAILY [facility]


   Last Admin: 01/11/18 12:06 Dose:  10 mg


Magnesium Oxide (Mag-Ox)  400 mg PO BID [facility]


   Last Admin: 01/11/18 12:06 Dose:  400 mg


Pantoprazole Sodium (Protonix Inj)  40 mg IVP Q12 [facility]


   Last Admin: 01/11/18 12:05 Dose:  40 mg


Sodium Chloride (Ocean Nasal Spray)  2 ml NS BID PRN


   PRN Reason: Nasal congestion











- Labs


Labs: 


 





 01/11/18 06:30 





 01/11/18 06:30 





 











PT  11.4 SECONDS (9.4-12.5)   01/10/18  13:23    


 


INR  0.99  (0.93-1.08)   01/10/18  13:23    


 


APTT  30.8 Seconds (25.1-36.5)   01/10/18  13:23    














- Constitutional


Appears: Non-toxic, No Acute Distress





- Head Exam


Head Exam: ATRAUMATIC, NORMAL INSPECTION, NORMOCEPHALIC





- Eye Exam


Eye Exam: EOMI, Normal appearance





- ENT Exam


ENT Exam: Mucous Membranes Moist, Normal Exam





- Neck Exam


Neck Exam: Normal Inspection





- Respiratory Exam


Respiratory Exam: Clear to Ausculation Bilateral, NORMAL BREATHING PATTERN





- Cardiovascular Exam


Cardiovascular Exam: REGULAR RHYTHM, +S1, +S2





- GI/Abdominal Exam


GI & Abdominal Exam: Soft, Normal Bowel Sounds





- Extremities Exam


Extremities Exam: Pedal Edema





- Neurological Exam


Neurological Exam: Alert, Awake, Oriented x3





- Psychiatric Exam


Psychiatric exam: Normal Affect, Normal Mood





- Skin


Skin Exam: Intact, Normal Color, Warm





Assessment and Plan





- Assessment and Plan (Free Text)


Assessment: 





Patient is a 57 year old female with past medical history of chronic migraines, 

hypotension presenting to [facility] emergency department with 

complaints of generalized weakness, headaches, shortness of breath, headaches, 

and epigastric pain.





Plan: 





Syncope r/o ACS versus migraine versus vasovagal versus orthostasis


- Troponin negative x 3


- EKG; normal sinus rhythm 


- carotid doppler; results pending


- echocardiogram; results pending


- orthostatics; positive. Started on intravenous fluids. 


- CBC, CMP, TSH, fasting lipid panel, HgA1C: liver enzymes are downtrending, 

TSH normal, lipid panel significant for trigylcerides 185, HgA1C


- Cardiology consult; recommendations appreciated


- Neurology consult; MRA head and neck ordered; results pending.Atorvastatin 

and Lipitor started as well considering findings of chronic small ischemic 

vessel changes on MRI brain.


- continue with neuro checks





Migraines


-  Patient given dexamethasone, valproic acid, and magnesium oxide. Discussed 

case with headache specialist Dr. Coyle. Dr. Coyle would like patient to be placed 

on imitrex, flexaril, and topomax. Will see how patient tolerates this 

medication regimen. 


- Regular diet





Hypertension


- Continue to monitor according to headache treatment. If hypertension persists 

will consider anti-hypertensives





Physcial Therapy Evaluation


- Complains of issues ambulating; awaiting physical therapy assessment.





Deep vein thrombosis/Gastrointestinal prophylaxis


SCDs and heparin drip/Protonix








<Pierre Wesley - Last Filed: 01/11/18 16:18>





Objective





- Vital Signs/Intake and Output


Vital Signs (last 24 hours): 


 











Temp Pulse Resp BP Pulse Ox


 


 97.4 F L  101 H  20   156/95 H  98 


 


 01/11/18 12:00  01/11/18 14:00  01/11/18 12:00  01/11/18 12:00  01/11/18 06:00








Intake and Output: 


 











 01/11/18 01/11/18





 06:59 18:59


 


Intake Total 300 240


 


Balance 300 240














- Medications


Medications: 


 Current Medications





Aspirin (Aspirin Chewable)  81 mg PO DAILY [facility]


   Last Admin: 01/11/18 12:06 Dose:  81 mg


Atorvastatin Calcium (Lipitor)  40 mg PO DIN [facility]


Cyclobenzaprine HCl (Flexeril)  5 mg PO TID [facility]


   Last Admin: 01/11/18 15:06 Dose:  5 mg


Heparin Sodium (Porcine) (Heparin)  5,000 units SC Q12 [facility]


   PRN Reason: Protocol


   Last Admin: 01/11/18 12:05 Dose:  5,000 units


Sodium Chloride (Sodium Chloride 0.9%)  1,000 mls @ 100 mls/hr IV .Q10H [facility]


   Last Admin: 01/11/18 12:38 Dose:  100 mls/hr


Loratadine (Claritin)  10 mg PO DAILY [facility]


   Last Admin: 01/11/18 12:06 Dose:  10 mg


Losartan Potassium (Cozaar)  100 mg PO DAILY [facility]


Magnesium Oxide (Mag-Ox)  400 mg PO BID [facility]


Pantoprazole Sodium (Protonix Ec Tab)  40 mg PO Q12 [facility]


Sodium Chloride (Ocean Nasal Spray)  2 ml NS BID PRN


   PRN Reason: Nasal congestion


Topiramate (Topamax)  50 mg PO BID [facility]


   PRN Reason: Protocol


   Last Admin: 01/11/18 15:06 Dose:  50 mg











- Labs


Labs: 


 





 01/11/18 06:30 





 01/11/18 06:30 





 











PT  11.4 SECONDS (9.4-12.5)   01/10/18  13:23    


 


INR  0.99  (0.93-1.08)   01/10/18  13:23    


 


APTT  30.8 Seconds (25.1-36.5)   01/10/18  13:23    














Attending/Attestation





- Attestation


I have personally seen and examined this patient.: Yes


I have fully participated in the care of the patient.: Yes


I have reviewed all pertinent clinical information, including history, physical 

exam and plan: Yes


Notes (Text): 





01/11/18 16:13


57 year old female with past medical history of chronic migraines who presented 

with complaint of headache, high blood pressure and syncopal episode.


CT and MRI brain were reviewed.  Echocardiogram, carotid dopplers and EEG are 

pending.  Neurology and cardiology evaluations were also requested.


She is on aspirin and statin.  She was started on cozaar as well.


Also started on imitrex, flexaril and topamax.  Will check for response.


Mild transaminitis noted; monitor LFTs closely while on statin.


PT evaluation was also requested.





Pierre Wesley MD


Hospitalist.

## 2018-01-11 NOTE — CON
DATE:  01/11/2018



HISTORY:  The patient is a 57-year-old woman who presented with headaches

and general malaise.



She states that she has no history of hypertension in the past, but has

noted elevated blood pressures at home.



She states that there was an episode in which she had a near syncopal

episode with dizziness without loss of consciousness.



Her medications are unclear at home.  However, she was diagnosed in [place]

[place] with chronic fatigue syndrome.



She denies chest pain.  She does complain of intermittent shortness of

breath.



Her cardiac workup included a stress test performed in 2016 which revealed

an EF of 62% with no ischemic changes.



SOCIAL HISTORY:  The patient does not smoke.



REVIEW OF SYSTEMS:  A 14-point review of systems was reviewed.  Chronic

general malaise, dyspnea without edema was noted.  No angina noted.



PHYSICAL EXAMINATION:

VITAL SIGNS:  Blood pressure 156/95, heart rate is in the 80s.

NECK:  Negative JVD.

LUNGS:  Without rales.

HEART:  S1, S2.

EXTREMITIES:  Without edema.



EKG shows normal sinus rhythm with no acute changes.



LABORATORY DATA:  Reveals troponins are negative x3.  BUN and creatinine

are within normal limits.  Her TSH is 3.44.  The hemoglobin is 13.8. 

Preliminary echocardiogram reveals good LV function with no LV outflow

obstruction.



CT scan of the chest revealed no evidence of pneumonia or lung mass.



IMPRESSION:

1.  Fatigue.

2.  Chronic dizziness without loss of consciousness.

3.  Hypertension.

4.  Obesity.

5.  Chronic fatigue syndrome.



Given these findings, there is no evidence for cardiac cause of her

symptoms.  Her carotid ultrasound as well as her abdominal ultrasound is

still pending.



We will discontinue telemetry today.  I have discussed with the patient

about including an exercise program in her life after she is discharged.  I

will begin Diovan to help better control of her blood pressure.  We will

discontinue telemetry today.





__________________________________________

Aric Sarah MD





DD:  01/11/2018 16:10:00

DT:  01/11/2018 16:16:33

Job # [id number]

## 2018-01-11 NOTE — CT
PROCEDURE:  CT Chest without contrast



HISTORY:

hemoptysis



COMPARISON:

None.



TECHNIQUE:

Contiguous axial images were obtained through the chest without 

intravenous contrast enhancement. Sagittal and coronal 

reconstructions were performed.







Radiation dose (DLP): 721 mGy-cm. 



This CT exam was performed using one or more of the following dose 

reduction techniques: Automated exposure control, adjustment of the 

mA and/or kV according to patient size, and/or use of iterative 

reconstruction technique.



FINDINGS:



LUNGS:

There is a 4 x 8 mm density in the right upper lobe. This does not 

have the typical appearance of a nodule and most likely represents an 

area of parenchymal scarring. This is seen on image 54 series 3 



MEDIASTINUM:

Unremarkable thoracic aorta. No aneurysm. Normal sized heart. Main 

pulmonary artery unremarkable. No vascular congestion. No 

lymphadenopathy.



PLEURA:

No pleural fluid. No pneumothorax.



BONES:

No fracture. No destructive lesion. 



UPPER ABDOMEN:

Grossly unremarkable.



OTHER FINDINGS:

None.



IMPRESSION:

Small focal density in the right upper lobe measuring 4 x 8 mm most 

likely representing scar.



There is no evidence of a lung mass or pneumonia

## 2018-01-11 NOTE — MRI
EXAM:

  MR Angiography Neck Without Intravenous Contrast



EXAM DATE/TIME:

  1/11/2018 7:28 AM



CLINICAL HISTORY:

  The patient age is 57 years old and is female; Signs and symptoms; Headache 

and vertigo; Additional info: Follow up mri Facility exam id and description: 

Mri mra necks mra neck without contrast



TECHNIQUE:

  Magnetic resonance angiography images of the neck without intravenous 

contrast.



COMPARISON:

  No relevant prior studies available.



FINDINGS:

  Right common carotid artery:  There is no significant stenosis or occlusion 

of the bilateral distal common carotid arteries. Evaluation of the remaining 

common carotid arteries is otherwise technically suboptimal.

  Right internal carotid artery:  Evaluation of right internal carotid artery 

is technically suboptimal.  No occlusion.

  Right external carotid artery:  No occlusion. Evaluation technically limited.

  Right vertebral artery:  Artifact limits evaluation of the bilateral 

vertebral arteries both proximally and distally. There is no significant 

stenosis or occlusion of the mid vertebral arteries bilaterally.



  Left common carotid artery:  See above.

  Left internal carotid artery:  There is increased tortuosity of the left 

internal carotid artery, without hemodynamically significant stenosis or 

occlusion. Artifact limits evaluation of the proximal left internal carotid 

artery.

  Left external carotid artery:  No occlusion. Evaluation technically limited.

  Left vertebral artery:  See above.





 CAROTID STENOSIS REFERENCE USING NASCET CRITERIA:

  % ICA stenosis = (1 - narrowest ICA diameter/diameter of distal cervical ICA) 

x 100.

  Mild - <50% stenosis.

  Moderate - 50-69% stenosis.

  Severe - 70-94% stenosis.

  Near occlusion - 95-99% stenosis.

  Occluded - 100% stenosis.



IMPRESSION:     

1.  There is increased tortuosity of the left internal carotid artery, without 

hemodynamically significant stenosis or occlusion. 

2.  Evaluation of right internal carotid artery and bilateral common carotid 

arteries are technically suboptimal.  Correlation with CTA or postcontrast MRA 

is recommended.

3.  Artifact also limits evaluation of the bilateral vertebral arteries.

## 2018-01-11 NOTE — MRI
EXAM:

  MR Angiography Head Without Intravenous Contrast



EXAM DATE/TIME:

  1/11/2018 7:27 AM



CLINICAL HISTORY:

  The patient age is 57 years old and is female; Signs and symptoms; Dizziness 

and giddiness and headache; Additional info: Follow up mri, headache Facility 

exam id and description: Mri mra heads mra head without contrast



TECHNIQUE:

  Magnetic resonance angiography images of the head without intravenous 

contrast.



COMPARISON:

  MR - BRAIN WITHOUT CONTRAST 2018-01-10 19:30



FINDINGS:

  Right internal carotid artery:  No acute findings.  Intracranial segment is 

patent with no significant stenosis.  No aneurysm.

  Right anterior cerebral artery:  The A2 segments of the bilateral anterior 

cerebral arteries appear to be duplicated, which is likely due to motion 

artifact. Although a linear band of hypointensity is visualized on the raw data 

in the region of these vessels, dissection is considered less likely.  No 

aneurysm.

  Right middle cerebral artery:  No occlusion or significant stenosis.  No 

aneurysm.

  Right posterior cerebral artery:  No occlusion or significant stenosis.  No 

aneurysm.

  Right vertebral artery:  Not included in the field-of-view of the study.



  Left internal carotid artery:  No acute findings.  Intracranial segment is 

patent with no significant stenosis.  No aneurysm.

  Left anterior cerebral artery:  See above.

  Left middle cerebral artery:  No occlusion or significant stenosis.  No 

aneurysm.

  Left posterior cerebral artery:  There is persistence of the fetal origin of 

the left posterior cerebral artery. There is mild decrease in caliber of this 

vessel proximally, which is either developmental or due to mild stenosis.  No 

aneurysm.

  Left vertebral artery: Suboptimal evaluation.



  Basilar artery:  There is increased tortuosity of the basilar artery.  No 

occlusion or significant stenosis.  No aneurysm.



IMPRESSION:     

1.  There is persistence of the fetal origin of the left posterior cerebral 

artery. There is mild decrease in caliber of this vessel proximally, which is 

either developmental or due to mild stenosis.

2.  The A2 segments of the bilateral anterior cerebral arteries appear to be 

duplicated, which is likely due to motion artifact. Although a linear band of 

hypointensity is visualized on the raw data in the region of these vessels, 

dissection is considered less likely.  This can be further evaluated with CTA.

3.  Additional findings described above.

## 2018-01-11 NOTE — CARD
--------------- APPROVED REPORT --------------





EXAM: Two-dimensional and M-mode echocardiogram with Doppler and 

color Doppler.



INDICATION

Syncope 



2D DIMENSIONS 

Left Atrium (2D)3.8   (1.6-4.0cm)IVSd1.2   (0.7-1.1cm)

Aortic Root (2D)3.1   (2.0-3.7cm)LVDd3.8   (3.9-5.9cm)

PWd1.1   (0.7-1.1cm)LVDs2.1   (2.5-4.0cm)

FS (%) 45.9   %LVEF (%)78.0   (>50%)



M-Mode DIMENSIONS 

Aortic Cusp Exc.1.40   (1.5-2.0cm)



Aortic Valve

AoV Peak Kzvtkzao752.0cm/Kim Peak GR.9mmHg



Mitral Valve

MV E Myblvhlt88.2cm/sMV A Npcanxlk204.0cm/sE/A ratio0.6



TDI

Lateral E' Peak V8.58cm/sMedial E' Peak V4.78cm/sE/Lateral E'7.6

E/Medial E'13.6



Pulmonary Valve

PV Peak Klmmpite839.0cm/sPV Peak Grad.6mmHg



Tricuspid Valve

TR Peak Wbnsacst604kx/sRAP [id number] Peak Gr.16mmHg

[id number]



 LEFT VENTRICLE 

The left ventricle is normal size. There is borderline concentric 

left ventricular hypertrophy. The left ventricular function is 

normal. The left ventricular ejection fraction is within the normal 

range. There is normal LV segmental wall motion. Transmitral Doppler 

flow pattern is Grade I-abnormal relaxation pattern.



 RIGHT VENTRICLE 

The right ventricle is normal size. There is normal right ventricular 

wall thickness. The right ventricular systolic function is normal.



 ATRIA 

The left atrium size is normal. The right atrium size is normal.



 AORTIC VALVE 

The aortic valve is normal in structure. No aortic regurgitation is 

present. There is no aortic valvular stenosis. 



 MITRAL VALVE 

The mitral valve is normal in structure. There is no mitral valve 

regurgitation noted. There is no mitral valve stenosis.



 TRICUSPID VALVE 

The tricuspid valve is normal in structure. There is no tricuspid 

valve regurgitation noted.



 GREAT VESSELS 

The aortic root is normal in size. The IVC was not visualized.



 PERICARDIAL EFFUSION 

There is no pericardial effusion.



<Conclusion>

The left ventricle is normal size.

There is borderline concentric left ventricular hypertrophy.

The left ventricular function is normal.

The left ventricular ejection fraction is within the normal range.

There is normal LV segmental wall motion.

Transmitral Doppler flow pattern is Grade I-abnormal relaxation 

pattern.

## 2018-01-11 NOTE — CARD
--------------- APPROVED REPORT --------------





EKG Measurement

Heart [id number]

DC [id number]

[id number]

[id number]

ATq123



<Conclusion>

*** Poor data quality, interpretation may be adversely affected

Normal sinus rhythm

Voltage criteria for left ventricular hypertrophy

Abnormal ECG

## 2018-01-12 LAB
ALBUMIN SERPL-MCNC: 4 G/DL (ref 3–4.8)
ALBUMIN/GLOB SERPL: 1.3 {RATIO} (ref 1.1–1.8)
ALT SERPL-CCNC: 55 U/L (ref 7–56)
AST SERPL-CCNC: 39 U/L (ref 14–36)
BASOPHILS # BLD AUTO: 0.01 K/MM3 (ref 0–2)
BASOPHILS NFR BLD: 0.1 % (ref 0–3)
BUN SERPL-MCNC: 13 MG/DL (ref 7–21)
CALCIUM SERPL-MCNC: 9.4 MG/DL (ref 8.4–10.5)
EOSINOPHIL # BLD: 0 10*3/UL (ref 0–0.7)
EOSINOPHIL NFR BLD: 0 % (ref 1.5–5)
ERYTHROCYTE [DISTWIDTH] IN BLOOD BY AUTOMATED COUNT: 14 % (ref 11.5–14.5)
GFR NON-AFRICAN AMERICAN: > 60
GRANULOCYTES # BLD: 9.48 10*3/UL (ref 1.4–6.5)
GRANULOCYTES NFR BLD: 78.7 % (ref 50–68)
HGB BLD-MCNC: 13.1 G/DL (ref 12–16)
LYMPHOCYTES # BLD: 1.9 10*3/UL (ref 1.2–3.4)
LYMPHOCYTES NFR BLD AUTO: 15.6 % (ref 22–35)
MCH RBC QN AUTO: 32.4 PG (ref 25–35)
MCHC RBC AUTO-ENTMCNC: 33.3 G/DL (ref 31–37)
MCV RBC AUTO: 97.3 FL (ref 80–105)
MONOCYTES # BLD AUTO: 0.7 10*3/UL (ref 0.1–0.6)
MONOCYTES NFR BLD: 5.6 % (ref 1–6)
PLATELET # BLD: 312 10^3/UL (ref 120–450)
PMV BLD AUTO: 9 FL (ref 7–11)
RBC # BLD AUTO: 4.04 10^6/UL (ref 3.5–6.1)
WBC # BLD AUTO: 12.1 10^3/UL (ref 4.5–11)

## 2018-01-12 RX ADMIN — PANTOPRAZOLE SODIUM SCH MG: 40 TABLET, DELAYED RELEASE ORAL at 22:20

## 2018-01-12 RX ADMIN — PANTOPRAZOLE SODIUM SCH MG: 40 TABLET, DELAYED RELEASE ORAL at 09:11

## 2018-01-12 RX ADMIN — Medication SCH MG: at 18:17

## 2018-01-12 RX ADMIN — Medication SCH MG: at 09:12

## 2018-01-12 NOTE — CP.PCM.CON
Past Patient History





- Past Social History


Smoking Status: Never Smoked





- CARDIAC


Hx Cardiac Disorders: Yes (Hypotension)


Hx Hypertension: Yes


Hx Peripheral Edema: Yes (b/l lower extremity +2,+1 R>L)





- PULMONARY


Hx Respiratory Disorders: No





- NEUROLOGICAL


Hx Neurological Disorder: Yes


Other/Comment: Fibromyalgia, chronic fatigue





- HEENT


Hx HEENT Problems: Yes (Glasses)





- RENAL


Hx Chronic Kidney Disease: No





- ENDOCRINE/METABOLIC


Hx Endocrine Disorders: No





- HEMATOLOGICAL/ONCOLOGICAL


Hx Blood Disorders: No





- INTEGUMENTARY


Hx Dermatological Problems: No





- MUSCULOSKELETAL/RHEUMATOLOGICAL


Hx Falls: Yes


Hx Unsteady Gait: Yes





- GASTROINTESTINAL


Hx Gastroesophageal Reflux: Yes





- GENITOURINARY/GYNECOLOGICAL


Hx Genitourinary Disorders: No





- PSYCHIATRIC


Hx Substance Use: No





- SURGICAL HISTORY


Hx Hysterectomy: Yes


Other/Comment: Rt. Knee, Rt. Shoulder





Meds


Allergies/Adverse Reactions: 


 Allergies











Allergy/AdvReac Type Severity Reaction Status Date / Time


 


No Known Allergies Allergy   Verified 01/10/18 20:53














- Medications


Medications: 


 Current Medications





Acetaminophen (Tylenol 325mg Tab)  650 mg PO Q6H PRN


   PRN Reason: Headache


   Last Admin: 01/12/18 13:11 Dose:  650 mg


Aspirin (Aspirin Chewable)  81 mg PO DAILY [facility]


   Last Admin: 01/12/18 09:11 Dose:  81 mg


Atorvastatin Calcium (Lipitor)  40 mg PO DIN [facility]


Carvedilol (Coreg)  6.25 mg PO BID [facility]


Cyclobenzaprine HCl (Flexeril)  5 mg PO TID [facility]


   Last Admin: 01/12/18 14:38 Dose:  5 mg


Dexamethasone (Decadron)  1 mg PO ONCE@2000 ONE


   Stop: 01/12/18 20:01


Heparin Sodium (Porcine) (Heparin)  5,000 units SC Q12 [facility]


   PRN Reason: Protocol


   Last Admin: 01/12/18 09:12 Dose:  5,000 units


Loratadine (Claritin)  10 mg PO DAILY [facility]


   Last Admin: 01/12/18 09:12 Dose:  10 mg


Lorazepam (Ativan)  0.5 mg PO TID PRN; Protocol


   PRN Reason: Anxiety


Losartan Potassium (Cozaar)  100 mg PO DAILY [facility]


   Last Admin: 01/12/18 09:12 Dose:  100 mg


Magnesium Oxide (Mag-Ox)  400 mg PO BID [facility]


   Last Admin: 01/12/18 09:12 Dose:  400 mg


Pantoprazole Sodium (Protonix Ec Tab)  40 mg PO Q12 [facility]


   Last Admin: 01/12/18 09:11 Dose:  40 mg


Sodium Chloride (Ocean Nasal Spray)  2 ml NS BID PRN


   PRN Reason: Nasal congestion


Topiramate (Topamax)  50 mg PO BID [facility]


   PRN Reason: Protocol


   Last Admin: 01/12/18 13:10 Dose:  50 mg











Results





- Vital Signs


Recent Vital Signs: 


 Last Vital Signs











Temp  98.8 F   01/11/18 19:06


 


Pulse  105 H  01/11/18 19:06


 


Resp  20   01/11/18 19:06


 


BP  159/101 H  01/11/18 19:06


 


Pulse Ox  98   01/11/18 06:00














- Labs


Result Diagrams: 


 01/12/18 06:30





 01/12/18 06:30


Labs: 


 Laboratory Results - last 24 hr











  01/12/18 01/12/18





  06:30 06:30


 


WBC  12.1 H D 


 


RBC  4.04 


 


Hgb  13.1 


 


Hct  39.3 


 


MCV  97.3 


 


MCH  32.4 


 


MCHC  33.3 


 


RDW  14.0 


 


Plt Count  312 


 


MPV  9.0 


 


Gran %  78.7 H 


 


Lymph % (Auto)  15.6 L 


 


Mono % (Auto)  5.6 


 


Eos % (Auto)  0.0 L 


 


Baso % (Auto)  0.1 


 


Gran #  9.48 H 


 


Lymph #  1.9 


 


Mono #  0.7 H 


 


Eos #  0.0 


 


Baso #  0.01 


 


Sodium   140


 


Potassium   3.9


 


Chloride   107


 


Carbon Dioxide   22


 


Anion Gap   15


 


BUN   13


 


Creatinine   0.6 L


 


Est GFR ( Amer)   > 60


 


Est GFR (Non-Af Amer)   > 60


 


Random Glucose   140 H


 


Calcium   9.4


 


Total Bilirubin   0.3


 


AST   39 H D


 


ALT   55


 


Alkaline Phosphatase   101


 


Total Protein   7.0


 


Albumin   4.0


 


Globulin   3.0


 


Albumin/Globulin Ratio   1.3

## 2018-01-12 NOTE — US
PROCEDURE:  Bilateral carotid artery duplex ultrasound 



HISTORY:

Carotid stenosis 



PHYSICIAN(S):  Aric Salas MD.



TECHNIQUE:

Duplex sonography and color-flow Doppler were used to evaluate the 

carotid bifurcations and limited segments of the vertebral arteries 

bilaterally. The exam is limited by tortuous vessels 



FINDINGS:

There is mild smooth hypoechoic plaque noted at the carotid 

bifurcations bilaterally. The peak systolic velocity in the proximal 

right internal carotid artery is 73 cm/sec. This corresponds to a 20 

to 39% proximal right ICA stenosis. Normal systolic velocities are 

noted in the proximal right external carotid artery. There is 

antegrade flow in the right vertebral artery.



The peak systolic velocity in the proximal left internal carotid 

artery is 88 cm/sec. This corresponds to a 20 to 39% proximal left 

ICA stenosis. Normal systolic velocities are noted in the proximal 

left external carotid artery. There is antegrade flow in the left 

vertebral artery.



IMPRESSION:

1. Bilateral 20-39% proximal ICA stenoses.



2. Antegrade flow in both vertebral arteries.

## 2018-01-12 NOTE — PN
DATE:



COVERING FOR:  Aric Sarah MD



SUBJECTIVE:  The patient was ambulated today with physical therapy.  She

did experience dizziness toward the end.  She denies any chest pain.



OBJECTIVE:

VITAL SIGNS:  Blood pressure 159/101, heart rate 105, temperature 98.8, and

respirations 20.  There was no significant postural changes in the blood

pressure.

HEENT:  Normocephalic.

CHEST:  Clear.

HEART:  S1 and S2, regular.

EXTREMITIES:  No edema.



LABORATORY DATA:  Hemoglobin and hematocrit are 13.1 and 39.3.  White count

and platelet counts are 12.1 and 312,000.  Today's SMA-7 is within normal

limits except for glucose of 140 and creatinine of 0.6.  Echocardiogram

revealed a borderline concentric LVH with normal ejection fraction and

normal segmental wall motion, grade 1 abnormal relaxation pattern.  EKG

revealed sinus rhythm with voltage criteria for LVH.  Chest CT scan, a

small focal density in right upper lobe, measuring 4 to 8 mm, most likely

representing scar.  No evidence of lung mass or pneumonia.  Neck MRI,

increased tortuosity of the left internal carotid artery without

hemodynamically significant stenosis or occlusion.  Evaluation of the right

internal carotid artery and bilateral common carotid arteries are

technically suboptimal.  Head MRI, persistent fetal origin of the left

posterior cerebral artery, there is mild decrease of the caliber of this

vessel proximally, which either developmental or due to mild stenosis.  The

A2 segment of the bilateral anterior cerebral arteries appear to be

duplicated, which is likely due to motion artifact, although linear band of

hypodensity is visualized on the _____ in the region of these vessels,

_____ is considered less likely.  This can be further evaluated with CT

angio.  Brain MRI, there is no restricted diffusion within the brain to

suggest acute ischemic changes.  Carotid Doppler. bilateral 20% to 39%

proximal internal carotid artery stenosis.  Antegrade flow in both

vertebral arteries.



ASSESSMENT:

1.  Dizziness.

2.  Hypertension.

3.  Obesity.

4.  Chronic fatigue syndrome.



CONDITIONS:  Continue aspirin 81 mg once a day, Coreg 6.25 mg twice a day,

Cozaar 100 mg once a day, subcutaneous heparin 5000 units twice a day,

Lipitor 40 mg once a day, and further findings of the brain MRI will be

discussed with a neurologist.





__________________________________________

Loi Monroy MD



DD:  01/12/2018 16:17:06

DT:  01/12/2018 18:20:23

[facility] # [id number]

## 2018-01-12 NOTE — CP.PCM.PN
Subjective





- Date & Time of Evaluation


Date of Evaluation: 01/12/18


Time of Evaluation: 07:37





- Subjective


Subjective: 





Ms. Myers was seen and examined at the bedside. She is alert, oriented. She 

states of her headache improved since admission. She denies any blurred vision, 

slurred speech, numbness, weakness, lightheadedness, nausea, or vomiting. She 

further claims of being able to sleep last night in comparison from previous 

night. There was no untoward events overnight.





Objective





- Vital Signs/Intake and Output


Vital Signs (last 24 hours): 


 











Temp Pulse Resp BP Pulse Ox


 


 98.8 F   105 H  20   159/101 H  98 


 


 01/11/18 19:06  01/11/18 19:06  01/11/18 19:06  01/11/18 19:06  01/11/18 06:00








Intake and Output: 


 











 01/12/18 01/12/18





 06:59 18:59


 


Intake Total 720 


 


Balance 720 














- Medications


Medications: 


 Current Medications





Aspirin (Aspirin Chewable)  81 mg PO DAILY [facility]


   Last Admin: 01/11/18 12:06 Dose:  81 mg


Atorvastatin Calcium (Lipitor)  40 mg PO DIN [facility]


Cyclobenzaprine HCl (Flexeril)  5 mg PO TID [facility]


   Last Admin: 01/11/18 15:06 Dose:  5 mg


Heparin Sodium (Porcine) (Heparin)  5,000 units SC Q12 [facility]


   PRN Reason: Protocol


   Last Admin: 01/11/18 22:32 Dose:  5,000 units


Sodium Chloride (Sodium Chloride 0.9%)  1,000 mls @ 100 mls/hr IV .Q10H [facility]


   Last Admin: 01/11/18 12:38 Dose:  100 mls/hr


Loratadine (Claritin)  10 mg PO DAILY [facility]


   Last Admin: 01/11/18 12:06 Dose:  10 mg


Losartan Potassium (Cozaar)  100 mg PO DAILY [facility]


   Last Admin: 01/11/18 19:02 Dose:  100 mg


Magnesium Oxide (Mag-Ox)  400 mg PO BID [facility]


Pantoprazole Sodium (Protonix Ec Tab)  40 mg PO Q12 [facility]


   Last Admin: 01/11/18 22:32 Dose:  40 mg


Sodium Chloride (Ocean Nasal Spray)  2 ml NS BID PRN


   PRN Reason: Nasal congestion


Topiramate (Topamax)  50 mg PO BID [facility]


   PRN Reason: Protocol


   Last Admin: 01/11/18 15:06 Dose:  50 mg











- Labs


Labs: 


 





 01/12/18 06:30 





 01/12/18 06:30 





 











PT  11.4 SECONDS (9.4-12.5)   01/10/18  13:23    


 


INR  0.99  (0.93-1.08)   01/10/18  13:23    


 


APTT  30.8 Seconds (25.1-36.5)   01/10/18  13:23    














- Constitutional


Appears: No Acute Distress





- Head Exam


Head Exam: NORMAL INSPECTION





- ENT Exam


Additional comments: 





pupils 3mm-2mm with light. 








- Neurological Exam


Neurological Exam: Alert, Awake, Oriented x3


Neuro motor strength exam: Left Upper Extremity: 5, Right Upper Extremity: 5, 

Left Lower Extremity: 5, Right Lower Extremity: 5


Additional comments: 





CN 2-12 normal. Speech fluent. memory normal. 








Assessment and Plan


(1) Headache


Assessment & Plan: 


Case discussed with Dr. Ordoñez, continue all current medical therapy. Recommend 

blood pressure control and for patient to return to her pain management 

physician as an outpatient.If there is a change of mental status to do CT of 

the head without contrast.


Status: Acute

## 2018-01-12 NOTE — CP.PCM.CON
History of Present Illness





- History of Present Illness


History of Present Illness: 





   57 yr old woman who presents with syncope and headache, with pmh of migraine 

(once per month), cervical herniated discs multilevel, s/p lumbar fusion and 

chronic neck and back pain, s/p epidural injections.  Miss Myers was initally 

evaluted due to hypertension in the Er, and then she had a syncopal spell, 

where she was walking and suddenly fell. There was no subdural, no seizure, no 

focal neurological deficits. Before and after the fall, she was complaining of 

severe headache, 10/10, with mild phtophobia, radiating from her neck to her 

forehead, sharp and throbbing in nature.  On admission, she was started on 

depakote and decadron, and magnesium, and headache improved. 


    Migraines occur once a month, with no aura, and with nausea, vomiting, and 

dizziness. She says this headache is different in that it is localized to her 

neck and radiates to her forehead. she usually uses relpax and was on topamax 

in the past with good relief, There is no history of adverse effects with 

topamax. 


    SHe follows with a pain management physician who has given her occipital 

nerve block in December and multiple epidiurals in the high cervical region. 

However, she has not had relief.  In fact, she has been advised by neurosurgery 

that she needs cervical fusion at multiple levels but refused. There is mild 

weakness in the extremities and dermatomal decrease in sensation No bowel or 

bladder incontinence. 








PMH/PSH: as above and hyperlipidemia


FH:SH: no smoking, no tobacco. 


All: nkda. 








On exam: 


  AAOX3. Pupils 3mm-2mm with light. EOMI. CN 2-12 normal. Speech fluent. memory 

normal. 


strength: 5/5 ul and ll bl. Sensory: decreased ft, pin in C5/C6 dermatome 

bilaterally. Decreased ROM in lumbar spine. 


+1 brachioradialis, biceps, +2 all other reflxes.Gait normal, no ataxia. ROM 

normal, VFF. no drift, Toes downgoing, 1 beat of clonus. 














Past Patient History





- Past Social History


Smoking Status: Never Smoked





- CARDIAC


Hx Cardiac Disorders: Yes (Hypotension)


Hx Hypertension: Yes


Hx Peripheral Edema: Yes (b/l lower extremity +2,+1 R>L)





- PULMONARY


Hx Respiratory Disorders: No





- NEUROLOGICAL


Hx Neurological Disorder: Yes


Other/Comment: Fibromyalgia, chronic fatigue





- HEENT


Hx HEENT Problems: Yes (Glasses)





- RENAL


Hx Chronic Kidney Disease: No





- ENDOCRINE/METABOLIC


Hx Endocrine Disorders: No





- HEMATOLOGICAL/ONCOLOGICAL


Hx Blood Disorders: No





- INTEGUMENTARY


Hx Dermatological Problems: No





- MUSCULOSKELETAL/RHEUMATOLOGICAL


Hx Falls: Yes


Hx Unsteady Gait: Yes





- GASTROINTESTINAL


Hx Gastroesophageal Reflux: Yes





- GENITOURINARY/GYNECOLOGICAL


Hx Genitourinary Disorders: No





- PSYCHIATRIC


Hx Substance Use: No





- SURGICAL HISTORY


Hx Hysterectomy: Yes


Other/Comment: Rt. Knee, Rt. Shoulder





Meds


Allergies/Adverse Reactions: 


 Allergies











Allergy/AdvReac Type Severity Reaction Status Date / Time


 


No Known Allergies Allergy   Verified 01/10/18 20:53














- Medications


Medications: 


 Current Medications





Aspirin (Aspirin Chewable)  81 mg PO DAILY [facility]


   Last Admin: 01/11/18 12:06 Dose:  81 mg


Atorvastatin Calcium (Lipitor)  40 mg PO DIN [facility]


Heparin Sodium (Porcine) (Heparin)  5,000 units SC Q12 [facility]


   PRN Reason: Protocol


   Last Admin: 01/11/18 12:05 Dose:  5,000 units


Sodium Chloride (Sodium Chloride 0.9%)  1,000 mls @ 100 mls/hr IV .Q10H [facility]


   Last Admin: 01/11/18 12:38 Dose:  100 mls/hr


Loratadine (Claritin)  10 mg PO DAILY [facility]


   Last Admin: 01/11/18 12:06 Dose:  10 mg


Magnesium Oxide (Mag-Ox)  400 mg PO BID [facility]


   Last Admin: 01/11/18 12:06 Dose:  400 mg


Pantoprazole Sodium (Protonix Inj)  40 mg IVP Q12 [facility]


   Last Admin: 01/11/18 12:05 Dose:  40 mg


Sodium Chloride (Ocean Nasal Spray)  2 ml NS BID PRN


   PRN Reason: Nasal congestion











Results





- Vital Signs


Recent Vital Signs: 


 Last Vital Signs











Temp  97.4 F L  01/11/18 12:00


 


Pulse  86   01/11/18 12:00


 


Resp  20   01/11/18 12:00


 


BP  156/95 H  01/11/18 12:00


 


Pulse Ox  98   01/11/18 06:00














- Labs


Result Diagrams: 


 01/11/18 06:30





 01/11/18 06:30


Labs: 


 Laboratory Results - last 24 hr











  01/10/18 01/10/18 01/11/18





  18:15 21:51 06:30


 


WBC    6.5


 


RBC    4.14


 


Hgb    13.4


 


Hct    40.9


 


MCV    98.8


 


MCH    32.4


 


MCHC    32.8


 


RDW    14.1


 


Plt Count    288


 


MPV    9.0


 


Gran %    40.9 L


 


Lymph % (Auto)    46.6 H


 


Mono % (Auto)    8.5 H


 


Eos % (Auto)    3.1


 


Baso % (Auto)    0.9


 


Gran #    2.64


 


Lymph #    3.0


 


Mono #    0.6


 


Eos #    0.2


 


Baso #    0.06


 


Sodium   


 


Potassium   


 


Chloride   


 


Carbon Dioxide   


 


Anion Gap   


 


BUN   


 


Creatinine   


 


Est GFR ( Amer)   


 


Est GFR (Non-Af Amer)   


 


Random Glucose   


 


Hemoglobin A1c   


 


Calcium   


 


Phosphorus   


 


Magnesium   


 


Total Bilirubin   


 


AST   


 


ALT   


 


Alkaline Phosphatase   


 


Lactate Dehydrogenase   589 


 


Total Creatine Kinase   211 


 


Troponin I   < 0.01 


 


NT-Pro-B Natriuret Pep   


 


Total Protein   


 


Albumin   


 


Globulin   


 


Albumin/Globulin Ratio   


 


Triglycerides   


 


Cholesterol   


 


LDL Cholesterol Direct   


 


HDL Cholesterol   


 


TSH 3rd Generation   


 


Urine Color  Yellow  


 


Urine Appearance  Cloudy  


 


Urine pH  7.0  


 


Ur Specific Gravity  1.015  


 


Urine Protein  Negative  


 


Urine Glucose (UA)  Negative  


 


Urine Ketones  Negative  


 


Urine Blood  Trace-intact H  


 


Urine Nitrate  Negative  


 


Urine Bilirubin  Negative  


 


Urine Urobilinogen  0.2  


 


Ur Leukocyte Esterase  Negative  


 


Urine RBC  1 - 3  


 


Urine WBC  0 - 2  


 


Ur Epithelial Cells  6 - 8  


 


Urine Bacteria  Mod  














  01/11/18 01/11/18 01/11/18





  06:30 06:30 06:30


 


WBC   


 


RBC   


 


Hgb   


 


Hct   


 


MCV   


 


MCH   


 


MCHC   


 


RDW   


 


Plt Count   


 


MPV   


 


Gran %   


 


Lymph % (Auto)   


 


Mono % (Auto)   


 


Eos % (Auto)   


 


Baso % (Auto)   


 


Gran #   


 


Lymph #   


 


Mono #   


 


Eos #   


 


Baso #   


 


Sodium  141  


 


Potassium  3.5 L  


 


Chloride  101  


 


Carbon Dioxide  28  


 


Anion Gap  16  


 


BUN  14  


 


Creatinine  0.9  


 


Est GFR ( Amer)  > 60  


 


Est GFR (Non-Af Amer)  > 60  


 


Random Glucose  125 H  


 


Hemoglobin A1c   6.2 


 


Calcium  9.3  


 


Phosphorus  4.2  


 


Magnesium  2.0  


 


Total Bilirubin  0.2  


 


AST  49 H  


 


ALT  64 H  


 


Alkaline Phosphatase  106  


 


Lactate Dehydrogenase  456  


 


Total Creatine Kinase  172  


 


Troponin I  < 0.01  


 


NT-Pro-B Natriuret Pep   


 


Total Protein  7.0  


 


Albumin  4.1  


 


Globulin  2.8  


 


Albumin/Globulin Ratio  1.4  


 


Triglycerides  185 H  


 


Cholesterol  194  


 


LDL Cholesterol Direct  119  


 


HDL Cholesterol  55  


 


TSH 3rd Generation    3.44


 


Urine Color   


 


Urine Appearance   


 


Urine pH   


 


Ur Specific Gravity   


 


Urine Protein   


 


Urine Glucose (UA)   


 


Urine Ketones   


 


Urine Blood   


 


Urine Nitrate   


 


Urine Bilirubin   


 


Urine Urobilinogen   


 


Ur Leukocyte Esterase   


 


Urine RBC   


 


Urine WBC   


 


Ur Epithelial Cells   


 


Urine Bacteria   














  01/11/18





  09:45


 


WBC 


 


RBC 


 


Hgb 


 


Hct 


 


MCV 


 


MCH 


 


MCHC 


 


RDW 


 


Plt Count 


 


MPV 


 


Gran % 


 


Lymph % (Auto) 


 


Mono % (Auto) 


 


Eos % (Auto) 


 


Baso % (Auto) 


 


Gran # 


 


Lymph # 


 


Mono # 


 


Eos # 


 


Baso # 


 


Sodium 


 


Potassium 


 


Chloride 


 


Carbon Dioxide 


 


Anion Gap 


 


BUN 


 


Creatinine 


 


Est GFR ( Amer) 


 


Est GFR (Non-Af Amer) 


 


Random Glucose 


 


Hemoglobin A1c 


 


Calcium 


 


Phosphorus 


 


Magnesium 


 


Total Bilirubin 


 


AST 


 


ALT 


 


Alkaline Phosphatase 


 


Lactate Dehydrogenase 


 


Total Creatine Kinase 


 


Troponin I 


 


NT-Pro-B Natriuret Pep  29.1


 


Total Protein 


 


Albumin 


 


Globulin 


 


Albumin/Globulin Ratio 


 


Triglycerides 


 


Cholesterol 


 


LDL Cholesterol Direct 


 


HDL Cholesterol 


 


TSH 3rd Generation 


 


Urine Color 


 


Urine Appearance 


 


Urine pH 


 


Ur Specific Gravity 


 


Urine Protein 


 


Urine Glucose (UA) 


 


Urine Ketones 


 


Urine Blood 


 


Urine Nitrate 


 


Urine Bilirubin 


 


Urine Urobilinogen 


 


Ur Leukocyte Esterase 


 


Urine RBC 


 


Urine WBC 


 


Ur Epithelial Cells 


 


Urine Bacteria 














Assessment & Plan





- Assessment and Plan (Free Text)


Assessment: 





57 yr old woman with migraine and severe cervical spasm, as well as occipital 

neuralgia, and tension headache. She will need weekly occipital nerve blocks 

and TPI injections from pain management. 





Plan: 





1. Topamax 50 mg bid 


2. Imitrex 100 mg po now


3. Refer back to pain managemnt. 


4. Blood pressure control. 


5. flexaril 5 mg po daily

## 2018-01-12 NOTE — CARD
--------------- APPROVED REPORT --------------





EKG Measurement

Heart [id number]

CA [id number]

[id number]

[id number]

[id number]



<Conclusion>

Normal sinus rhythm

Moderate voltage criteria for LVH, may be normal variant

Borderline ECG

## 2018-01-13 VITALS — DIASTOLIC BLOOD PRESSURE: 92 MMHG | SYSTOLIC BLOOD PRESSURE: 142 MMHG | HEART RATE: 87 BPM

## 2018-01-13 VITALS — RESPIRATION RATE: 20 BRPM | OXYGEN SATURATION: 97 % | TEMPERATURE: 97.6 F

## 2018-01-13 LAB
ALBUMIN SERPL-MCNC: 3.8 G/DL (ref 3–4.8)
ALBUMIN/GLOB SERPL: 1.4 {RATIO} (ref 1.1–1.8)
ALT SERPL-CCNC: 50 U/L (ref 7–56)
AST SERPL-CCNC: 33 U/L (ref 14–36)
BASOPHILS # BLD AUTO: 0.03 K/MM3 (ref 0–2)
BASOPHILS NFR BLD: 0.4 % (ref 0–3)
BUN SERPL-MCNC: 14 MG/DL (ref 7–21)
CALCIUM SERPL-MCNC: 8.7 MG/DL (ref 8.4–10.5)
EOSINOPHIL # BLD: 0 10*3/UL (ref 0–0.7)
EOSINOPHIL NFR BLD: 0.5 % (ref 1.5–5)
ERYTHROCYTE [DISTWIDTH] IN BLOOD BY AUTOMATED COUNT: 14.3 % (ref 11.5–14.5)
GFR NON-AFRICAN AMERICAN: > 60
GRANULOCYTES # BLD: 4.99 10*3/UL (ref 1.4–6.5)
GRANULOCYTES NFR BLD: 64.1 % (ref 50–68)
HGB BLD-MCNC: 12.6 G/DL (ref 12–16)
LYMPHOCYTES # BLD: 2.2 10*3/UL (ref 1.2–3.4)
LYMPHOCYTES NFR BLD AUTO: 28.7 % (ref 22–35)
MCH RBC QN AUTO: 31.9 PG (ref 25–35)
MCHC RBC AUTO-ENTMCNC: 32.6 G/DL (ref 31–37)
MCV RBC AUTO: 98 FL (ref 80–105)
MONOCYTES # BLD AUTO: 0.5 10*3/UL (ref 0.1–0.6)
MONOCYTES NFR BLD: 6.3 % (ref 1–6)
PLATELET # BLD: 288 10^3/UL (ref 120–450)
PMV BLD AUTO: 8.9 FL (ref 7–11)
RBC # BLD AUTO: 3.95 10^6/UL (ref 3.5–6.1)
WBC # BLD AUTO: 7.8 10^3/UL (ref 4.5–11)

## 2018-01-13 RX ADMIN — PANTOPRAZOLE SODIUM SCH MG: 40 TABLET, DELAYED RELEASE ORAL at 09:17

## 2018-01-13 RX ADMIN — Medication SCH MG: at 09:18

## 2018-01-13 NOTE — CP.PCM.PN
Addendum entered and electronically signed by Joe Finnegan DO  01/13/18 10

:46: 





typing error made: originally states in plan atorvastatin and lipitor were 

started due to brain MRI findings however this should read aspirin and Lipitor 

started due to brain MRI findings, not atorvastatin and lipitor.





Original Note:








<Joe Finnegan - Last Filed: 01/13/18 05:19>





Subjective





- Date & Time of Evaluation


Date of Evaluation: 01/12/18


Time of Evaluation: 09:00





- Subjective


Subjective: 





Patient seen and examined at bedside in no acute distress. States she feels 

fine as the headache medication is working however her new concern is feeling 

dizzy when she gets up to walk. Endorses shortness of breath, dizziness. Denies 

nausea, vomiting, diarrhea, abdominal pain. 





Objective





- Vital Signs/Intake and Output


Vital Signs (last 24 hours): 


 











Temp Pulse Resp BP Pulse Ox


 


 98 F   68   18   123/68   98 


 


 01/12/18 18:06  01/12/18 18:20  01/12/18 18:06  01/12/18 18:20  01/12/18 18:06








Intake and Output: 


 











 01/12/18 01/13/18





 18:59 06:59


 


Intake Total  960


 


Balance  960














- Medications


Medications: 


 Current Medications





Acetaminophen (Tylenol 325mg Tab)  650 mg PO Q6H PRN


   PRN Reason: Headache


   Last Admin: 01/12/18 13:11 Dose:  650 mg


Aspirin (Aspirin Chewable)  81 mg PO DAILY [facility]


   Last Admin: 01/12/18 09:11 Dose:  81 mg


Atorvastatin Calcium (Lipitor)  40 mg PO DIN [facility]


   Last Admin: 01/12/18 18:24 Dose:  40 mg


Carvedilol (Coreg)  6.25 mg PO BID [facility]


   Last Admin: 01/12/18 18:20 Dose:  6.25 mg


Cyclobenzaprine HCl (Flexeril)  5 mg PO TID [facility]


   Last Admin: 01/12/18 18:24 Dose:  5 mg


Heparin Sodium (Porcine) (Heparin)  5,000 units SC Q12 [facility]


   PRN Reason: Protocol


   Last Admin: 01/12/18 09:12 Dose:  5,000 units


Loratadine (Claritin)  10 mg PO DAILY [facility]


   Last Admin: 01/12/18 09:12 Dose:  10 mg


Lorazepam (Ativan)  0.5 mg PO TID PRN; Protocol


   PRN Reason: Anxiety


Losartan Potassium (Cozaar)  50 mg PO DAILY [facility]


Magnesium Oxide (Mag-Ox)  400 mg PO BID [facility]


   Last Admin: 01/12/18 18:17 Dose:  400 mg


Pantoprazole Sodium (Protonix Ec Tab)  40 mg PO Q12 [facility]


   Last Admin: 01/12/18 09:11 Dose:  40 mg


Sodium Chloride (Ocean Nasal Spray)  2 ml NS BID PRN


   PRN Reason: Nasal congestion


Topiramate (Topamax)  50 mg PO BID [facility]


   PRN Reason: Protocol


   Last Admin: 01/12/18 18:17 Dose:  50 mg











- Labs


Labs: 


 





 01/12/18 06:30 





 01/12/18 06:30 





 











PT  11.4 SECONDS (9.4-12.5)   01/10/18  13:23    


 


INR  0.99  (0.93-1.08)   01/10/18  13:23    


 


APTT  30.8 Seconds (25.1-36.5)   01/10/18  13:23    














- Constitutional


Appears: Non-toxic, No Acute Distress





- Head Exam


Head Exam: ATRAUMATIC, NORMAL INSPECTION, NORMOCEPHALIC





- Eye Exam


Eye Exam: EOMI





- ENT Exam


ENT Exam: Mucous Membranes Moist





- Neck Exam


Neck Exam: Normal Inspection





- Respiratory Exam


Respiratory Exam: Clear to Ausculation Bilateral, NORMAL BREATHING PATTERN.  

absent: Rhonchi, Wheezes





- Cardiovascular Exam


Cardiovascular Exam: Tachycardia, REGULAR RHYTHM, +S1, +S2





- GI/Abdominal Exam


GI & Abdominal Exam: Soft, Normal Bowel Sounds





- Back Exam


Back Exam: NORMAL INSPECTION





- Neurological Exam


Neurological Exam: Alert, Awake, Oriented x3





- Psychiatric Exam


Psychiatric exam: Anxious, Normal Affect





- Skin


Skin Exam: Intact, Normal Color, Warm





Assessment and Plan





- Assessment and Plan (Free Text)


Assessment: 








Patient is a 57 year old female with past medical history of chronic migraines, 

hypotension presenting to [facility] emergency department with 

complaints of generalized weakness, headaches, shortness of breath, headaches, 

and epigastric pain.











Plan: 





Syncope r/o ACS versus migraine versus vasovagal versus orthostasis


- Troponin negative x 3


- EKG; normal sinus rhythm 


- carotid doppler; bilateral 20-39% proximal ICA stenosis


- echocardiogram; borderline concentric left ventricular hypertophy, aside from 

this finding there are no other abnormalities


- CT chest: no evidence of pneumonia or mass


- orthostatics; positive. Started on intravenous fluids. 


- CBC, CMP, TSH, fasting lipid panel, HgA1C: liver enzymes are downtrending, 

TSH normal, lipid panel significant for trigylcerides 185, HgA1C


- Cardiology consult; recommendations appreciated


- Neurology consult; MRA head reveals possible mild stenosis of left posterior 

cerebral artery and MRA neck reveals mild stenosis of left internal carotid 

artery which correlates with carotid doppler findings. Atorvastatin and Lipitor 

started as well considering findings of chronic small ischemic vessel changes 

on MRI brain.


- continue with neuro checks





Migraines


-  Patient given dexamethasone, valproic acid, and magnesium oxide. Discussed 

case with headache specialist Dr. Coyle. Dr. Coyle would like patient to be placed 

on imitrex, flexaril, and topomax. Will see how patient tolerates this 

medication regimen. 


- Regular diet





Hypertension


- Continue to monitor according to headache treatment. If hypertension persists 

will consider anti-hypertensives


- Nephrologt consulted; recommendations appreciated; dexamethasone suppression 

test to rule out Cushing's. 





Physcial Therapy Evaluation


- Complains of issues ambulating; awaiting physical therapy assessment.





Deep vein thrombosis/Gastrointestinal prophylaxis


SCDs and heparin drip/Protonix





Case discussed and reviewed with Dr. Gee Finnegan PGY1





<Davis Flynn - Last Filed: 01/13/18 15:16>





Objective





- Vital Signs/Intake and Output


Vital Signs (last 24 hours): 


 











Temp Pulse Resp BP Pulse Ox


 


 97.6 F   87   20   142/92 H  97 


 


 01/13/18 08:43  01/13/18 09:17  01/13/18 08:43  01/13/18 09:17  01/13/18 08:43








Intake and Output: 


 











 01/13/18 01/13/18





 06:59 18:59


 


Intake Total 960 


 


Balance 960 














- Labs


Labs: 


 





 01/13/18 07:30 





 01/13/18 07:30 





 











PT  11.4 SECONDS (9.4-12.5)   01/10/18  13:23    


 


INR  0.99  (0.93-1.08)   01/10/18  13:23    


 


APTT  30.8 Seconds (25.1-36.5)   01/10/18  13:23    














Attending/Attestation





- Attestation


I have personally seen and examined this patient.: Yes


I have fully participated in the care of the patient.: Yes


I have reviewed all pertinent clinical information, including history, physical 

exam and plan: Yes


Notes (Text): 





01/13/18 15:15








Attending note;





Patient seen and examined with resident.





Patient is a 57 year old female with past medical history of chronic fatigue 

syndrome , history of epidural injections , occipital nerve block, chronic 

migraines who presented with complaint of headache, high blood pressure and   

pre syncopal episode.


CT and MRI brain without significant ischemia.


Echocardiogram normal. Carotid Doppler normal.  Neurology and cardiology 

evaluation appreciated.





Hypertension; not on blood pressure medication at home. Currently on Diovan. 

Coreg added.





continue flexaril and topamax.





Mild transaminitis noted; abdominal ultrasound showed fatty liver.


PT evaluation appreciated. Patient has some gait instability.





Monitor today.





Possible discharge home tomorrow if PT clears  the patient.

## 2018-01-13 NOTE — CON
DATE:



HISTORY OF PRESENT ILLNESS:  The patient is 57-year-old  female

with not known previous psychiatric history.  Self-report history of

adjustment disorder and mood disorder.  The patient was admitted on the

medical side for evaluation of his chest pain and hypertension.  Psych

consult was called for evaluation of anxiety.  The patient was seen and

examined today.  The patient presented to be alert and oriented, pleasant,

cooperative.  The patient reported that she is not suffering from anxiety

and her anxiety is related to the medical issues and particular to

hypertensive episode which the patient started to have few months ago.  The

patient also complained that she feels that her heart beats fast, blood

pressure was 159/101.  The patient reported that she does not have any

panic attacks or generalized anxiety disorder.  The patient feels if her

blood pressure will be under control, she would be feeling much better. 

The patient denied hearing voices, denied seeing things and denied any

paranoid ideation.  The patient also denied using any drugs.  The patient

denied feeling depressed and denied thoughts of harming herself or others. 

The patient denied smoking or using alcohol.



PHYSICAL EXAMINATION

VITAL SIGNS:  Reviewed.  Temperature 98.8, pulse 105, blood pressure of

159/101 and respiration 20.



MEDICATIONS:  Reviewed.  Tylenol, aspirin, Lipitor, Coreg, Flexeril,

heparin, Claritin, magnesium oxide, pantoprazole Topamax 50 mg twice a day.



LABORATORY DATA:  Reviewed.  WBC 12.1 and a chemistry also reviewed. 

Urinalysis showed blood.



MENTAL STATUS EXAM:  The patient presented to be alert, oriented, pleasant,

cooperative, intermittent eye contact.  Speech was normal rate, tone,

quality and quantity.  Mood described, "I do not have any anxiety, I am

worried about my blood pressure."  Affect was constricted.  Mood congruent.

Thought process seems to be coherent and goal directed.  Thought content,

the patient denied visual, auditory or tactile hallucinations.  Denied

paranoid ideation.  The patient denied thoughts of harming herself or

others.  Denied intent or plan.  Insight and judgment fair.  Impulses are

well controlled.



IMPRESSION:  Rule out anxiety due to general medical condition, rule out

adjustment disorder with anxious mood.



PLAN:  This writer offered the patient a small dose of Ativan as needed for

anxiety as symptomatic treatment, the patient verbalized understanding. 

This writer educated the patient about risks, benefits and alternatives of

the medications.  This writer doubt that the patient has anxiety disorder,

but anxiety could be related to the medical issues.  The patient adamantly

denied thoughts of harming herself or others.  The patient pose no imminent

danger to self or others.  I will ask Dr. Wetzel to follow up on this

patient tomorrow to find out if the patient tolerates medications well. 

Meanwhile, if the patient will be discharged there is no contraindication

for that.













Thank you very much for letting me participate in care of your patient. 

Should you have any questions give me a call back.





__________________________________________

Claudia Rodriguez MD





DD:  01/12/2018 17:33:01

DT:  01/12/2018 17:39:16

Job # [id number]

## 2018-01-13 NOTE — CP.PCM.DIS
<Kashmir Swan - Last Filed: 01/13/18 11:17>





Provider





- Provider


Date of Admission: 


01/11/18 16:25





Attending physician: 


Davis Flynn MD





Consults: 





Neuro: Tiago


Nephro: Radha


Cardio: Tyrel


Psych: Claudia


Time Spent in preparation of Discharge (in minutes): 45





Hospital Course





- Lab Results


Lab Results: 


 Most Recent Lab Values











WBC  7.8 10^3/ul (4.5-11.0)  D 01/13/18  07:30    


 


RBC  3.95 10^6/uL (3.5-6.1)   01/13/18  07:30    


 


Hgb  12.6 g/dL (12.0-16.0)   01/13/18  07:30    


 


Hct  38.7 % (36.0-48.0)   01/13/18  07:30    


 


MCV  98.0 fl (80.0-105.0)   01/13/18  07:30    


 


MCH  31.9 pg (25.0-35.0)   01/13/18  07:30    


 


MCHC  32.6 g/dl (31.0-37.0)   01/13/18  07:30    


 


RDW  14.3 % (11.5-14.5)   01/13/18  07:30    


 


Plt Count  288 10^3/uL (120.0-450.0)   01/13/18  07:30    


 


MPV  8.9 fl (7.0-11.0)   01/13/18  07:30    


 


Gran %  64.1 % (50.0-68.0)   01/13/18  07:30    


 


Lymph % (Auto)  28.7 % (22.0-35.0)   01/13/18  07:30    


 


Mono % (Auto)  6.3 % (1.0-6.0)  H  01/13/18  07:30    


 


Eos % (Auto)  0.5 % (1.5-5.0)  L  01/13/18  07:30    


 


Baso % (Auto)  0.4 % (0.0-3.0)   01/13/18  07:30    


 


Gran #  4.99  (1.4-6.5)   01/13/18  07:30    


 


Lymph #  2.2  (1.2-3.4)   01/13/18  07:30    


 


Mono #  0.5  (0.1-0.6)   01/13/18  07:30    


 


Eos #  0.0  (0.0-0.7)   01/13/18  07:30    


 


Baso #  0.03 K/mm3 (0.0-2.0)   01/13/18  07:30    


 


PT  11.4 SECONDS (9.4-12.5)   01/10/18  13:23    


 


INR  0.99  (0.93-1.08)   01/10/18  13:23    


 


APTT  30.8 Seconds (25.1-36.5)   01/10/18  13:23    


 


Sodium  141 mmol/L (132-148)   01/13/18  07:30    


 


Potassium  3.9 mmol/L (3.6-5.0)   01/13/18  07:30    


 


Chloride  110 mmol/L ()  H  01/13/18  07:30    


 


Carbon Dioxide  22 mmol/L (21-33)   01/13/18  07:30    


 


Anion Gap  13  (10-20)   01/13/18  07:30    


 


BUN  14 mg/dL (7-21)   01/13/18  07:30    


 


Creatinine  0.8 mg/dl (0.7-1.2)   01/13/18  07:30    


 


Est GFR ( Amer)  > 60   01/13/18  07:30    


 


Est GFR (Non-Af Amer)  > 60   01/13/18  07:30    


 


POC Glucose (mg/dL)  102 mg/dL ()   01/10/18  13:21    


 


Random Glucose  106 mg/dL ()   01/13/18  07:30    


 


Hemoglobin A1c  6.2 % (4.2-6.5)   01/11/18  06:30    


 


Calcium  8.7 mg/dL (8.4-10.5)   01/13/18  07:30    


 


Phosphorus  4.2 mg/dL (2.5-4.5)   01/11/18  06:30    


 


Magnesium  2.0 mg/dL (1.7-2.2)   01/11/18  06:30    


 


Total Bilirubin  0.2 mg/dL (0.2-1.3)   01/13/18  07:30    


 


AST  33 U/L (14-36)   01/13/18  07:30    


 


ALT  50 U/L (7-56)   01/13/18  07:30    


 


Alkaline Phosphatase  84 U/L ()   01/13/18  07:30    


 


Lactate Dehydrogenase  456 U/L (333-699)   01/11/18  06:30    


 


Total Creatine Kinase  172 U/L ()   01/11/18  06:30    


 


CK-MB (CK-2)  0.8 ng/mL (0.0-3.6)   01/10/18  13:23    


 


CK-MB (CK-2) %  Cancelled   01/10/18  13:23    


 


Troponin I  < 0.01 ng/mL  01/11/18  06:30    


 


NT-Pro-B Natriuret Pep  29.1 pg/mL (0-450)   01/11/18  09:45    


 


Total Protein  6.5 g/dL (5.8-8.3)   01/13/18  07:30    


 


Albumin  3.8 g/dL (3.0-4.8)   01/13/18  07:30    


 


Globulin  2.8 gm/dL  01/13/18  07:30    


 


Albumin/Globulin Ratio  1.4  (1.1-1.8)   01/13/18  07:30    


 


Triglycerides  185 mg/dL ()  H  01/11/18  06:30    


 


Cholesterol  194 mg/dL (130-200)   01/11/18  06:30    


 


LDL Cholesterol Direct  119 mg/dL (0-129)   01/11/18  06:30    


 


HDL Cholesterol  55 mg/dL (29-60)   01/11/18  06:30    


 


TSH 3rd Generation  3.44 mIU/mL (0.46-4.68)   01/11/18  06:30    


 


Urine Color  Yellow  (YELLOW)   01/10/18  18:15    


 


Urine Appearance  Cloudy  (CLEAR)   01/10/18  18:15    


 


Urine pH  7.0  (4.7-8.0)   01/10/18  18:15    


 


Ur Specific Gravity  1.015  (1.005-1.035)   01/10/18  18:15    


 


Urine Protein  Negative mg/dL (<30 mg/dL)   01/10/18  18:15    


 


Urine Glucose (UA)  Negative mg/dL (NEGATIVE)   01/10/18  18:15    


 


Urine Ketones  Negative mg/dL (NEGATIVE)   01/10/18  18:15    


 


Urine Blood  Trace-intact  (NEGATIVE)  H  01/10/18  18:15    


 


Urine Nitrate  Negative  (NEGATIVE)   01/10/18  18:15    


 


Urine Bilirubin  Negative  (NEGATIVE)   01/10/18  18:15    


 


Urine Urobilinogen  0.2 E.U./dL (<1 E.U./dL)   01/10/18  18:15    


 


Ur Leukocyte Esterase  Negative Danyelle/uL (NEGATIVE)   01/10/18  18:15    


 


Urine RBC  1 - 3 /hpf (0-2)   01/10/18  18:15    


 


Urine WBC  0 - 2 /hpf (0-6)   01/10/18  18:15    


 


Ur Epithelial Cells  6 - 8 /hpf (0-5)   01/10/18  18:15    


 


Urine Bacteria  Mod  (NEG)   01/10/18  18:15    














- Hospital Course


Hospital Course: 





Patient is a 57 year old female with past medical history of chronic migraines, 

hypotension presenting to [facility] emergency department with 

complaints of generalized weakness, headaches, shortness of breath, headaches, 

and epigastric pain. While in the emergency department patient experienced a 

syncopal episode for which code star was called.  Preliminary tests were 

ordered including EKG, Chest x-ray, carotid dopplers, echocardiogram and labs. 

EKG revealed normal sinus rhythm, Chest x-ray revealed no acute disease, 

carotid doppler revealed 20-39% bilateral stenosis of the proximal internal 

carotid arteries, echocardiogram revealed no abnormalities aside from mild 

concetric left ventricular hypertrophy, and labs were normal aside from 

elevated liver enzymes which was later found to be due to fatty liver and 

hypokalemia which was immediately repleted. Due to patient's presentation, 

cardiology, neurology, and nephrology was consulted. From a cardiology point of 

view patient needed to be started on antihypertensives as well as an exercise 

program. Neurology diagnosed patient's headaches as tension headaches which 

would require occipital nerve blocks and eventualy a distectomy for further 

control. Further work up by neurology included MRA of the head and neck which 

revealed possible mild stenosis of left posterior cerebral artery and  mild 

stenosis of left internal carotid artery which correlates with carotid doppler 

findings. Due to patient's sudden hypertension with no exact cause, it was 

decided to consult nephrology to work up secondary causes of hypertension. 

Patient will continue to follow up with nephrology on an outpatient basis for 

further evaluation of causes of hypertension. Patient was provided with 

prescriptions to help with the tension headaches as well as antihypertensives. 

Considering the brain MRI finding of chronic ischemic changes in vessels, 

aspirin was also added to patient's medication regimen as per neurology. 

Patient was in agreement with plan of discharge, patient was then discharged. 





Discharge Diagnosis


1. Occipital Neuralgia


2. Tension Headache


3. Migraines


4. Syncope, likely vasovagal; ACS/CVA and VBI ruled out


5. Migraines


6. Hypertension





Discharge Medications


- ASA 81 mg PO Daily


- Lipitor 40 mg PO DIN


- Carvedilol 6.25 mg PO BID


- Ativan 0.5 mg PO TID


- Losartan 50 mg PO daily


- Mag-Ox 400 mg PO BID


- Protonix 40 mg PO daily


- Topiramate 50 mg PO BID





Discharge Exam





- Head Exam


Head Exam: ATRAUMATIC, NORMAL INSPECTION, NORMOCEPHALIC





- Eye Exam


Eye Exam: Normal appearance





- ENT Exam


ENT Exam: Normal Exam





- Neck Exam


Neck exam: Normal Inspection





- Respiratory Exam


Respiratory Exam: Clear to PA & Lateral.  absent: Rales, Rhonchi, Wheezes





- Cardiovascular Exam


Cardiovascular Exam: RRR, +S1, +S2.  absent: Diastolic murmur, Gallop, Rubs, 

Systolic Murmur





- GI/Abdominal Exam


GI & Abdominal Exam: Soft.  absent: Distended, Guarding, Rebound, Tenderness





- Rectal Exam


Rectal Exam: NORMAL INSPECTION





- Extremities Exam


Extremities exam: normal inspection





- Back Exam


Back exam: NORMAL INSPECTION





- Neurological Exam


Neurological exam: Alert, Oriented x3





- Psychiatric Exam


Psychiatric exam: Normal Affect, Normal Mood





- Skin


Skin Exam: Dry, Intact, Normal Color, Warm





Discharge Plan





- Discharge Medications


Prescriptions: 


Aspirin [Aspirin Chewable] 81 mg PO DAILY #14 chew


Atorvastatin [Lipitor] 40 mg PO DIN 14 Days  tab


Carvedilol [Coreg] 6.25 mg PO BID 14 Days  tab


LORazepam [Ativan] 0.5 mg PO TID #8 tab


Losartan [Cozaar] 50 mg PO DAILY 14 Days #14 tab


Magnesium Oxide [Mag-Ox] 400 mg PO BID #28 tab


Pantoprazole [Protonix EC Tab] 40 mg PO DAILY 14 Days  ect


Topiramate [Topamax] 50 mg PO BID #28 tab





- Follow Up Plan


Condition: FAIR


Disposition: HOME/ ROUTINE


Instructions:  Chest Pain (DC), Hypertension (DC), Near Syncope (ED)


Additional Instructions: 


1. Please follow up with your PMD within 3-5 days.


2. Please take medications provided to you as prescribed.


3. Follow up with your nephrologist Dr. Garcia regarding results of secondary 

hypertension work-up.


4. Follow up with your neurologist for occipital nerve block.


5. Do not hesitate to return to the emergency department is symptoms worsen or 

return. 


Referrals: 


Blaise Garcia MD [Staff Provider] - 





<Davis Flynn - Last Filed: 01/13/18 15:18>





Provider





- Provider


Date of Admission: 


01/11/18 16:25





Attending physician: 


Davis Flynn MD








Hospital Course





- Lab Results


Lab Results: 


 Most Recent Lab Values











WBC  7.8 10^3/ul (4.5-11.0)  D 01/13/18  07:30    


 


RBC  3.95 10^6/uL (3.5-6.1)   01/13/18  07:30    


 


Hgb  12.6 g/dL (12.0-16.0)   01/13/18  07:30    


 


Hct  38.7 % (36.0-48.0)   01/13/18  07:30    


 


MCV  98.0 fl (80.0-105.0)   01/13/18  07:30    


 


MCH  31.9 pg (25.0-35.0)   01/13/18  07:30    


 


MCHC  32.6 g/dl (31.0-37.0)   01/13/18  07:30    


 


RDW  14.3 % (11.5-14.5)   01/13/18  07:30    


 


Plt Count  288 10^3/uL (120.0-450.0)   01/13/18  07:30    


 


MPV  8.9 fl (7.0-11.0)   01/13/18  07:30    


 


Gran %  64.1 % (50.0-68.0)   01/13/18  07:30    


 


Lymph % (Auto)  28.7 % (22.0-35.0)   01/13/18  07:30    


 


Mono % (Auto)  6.3 % (1.0-6.0)  H  01/13/18  07:30    


 


Eos % (Auto)  0.5 % (1.5-5.0)  L  01/13/18  07:30    


 


Baso % (Auto)  0.4 % (0.0-3.0)   01/13/18  07:30    


 


Gran #  4.99  (1.4-6.5)   01/13/18  07:30    


 


Lymph #  2.2  (1.2-3.4)   01/13/18  07:30    


 


Mono #  0.5  (0.1-0.6)   01/13/18  07:30    


 


Eos #  0.0  (0.0-0.7)   01/13/18  07:30    


 


Baso #  0.03 K/mm3 (0.0-2.0)   01/13/18  07:30    


 


PT  11.4 SECONDS (9.4-12.5)   01/10/18  13:23    


 


INR  0.99  (0.93-1.08)   01/10/18  13:23    


 


APTT  30.8 Seconds (25.1-36.5)   01/10/18  13:23    


 


Sodium  141 mmol/L (132-148)   01/13/18  07:30    


 


Potassium  3.9 mmol/L (3.6-5.0)   01/13/18  07:30    


 


Chloride  110 mmol/L ()  H  01/13/18  07:30    


 


Carbon Dioxide  22 mmol/L (21-33)   01/13/18  07:30    


 


Anion Gap  13  (10-20)   01/13/18  07:30    


 


BUN  14 mg/dL (7-21)   01/13/18  07:30    


 


Creatinine  0.8 mg/dl (0.7-1.2)   01/13/18  07:30    


 


Est GFR ( Amer)  > 60   01/13/18  07:30    


 


Est GFR (Non-Af Amer)  > 60   01/13/18  07:30    


 


POC Glucose (mg/dL)  102 mg/dL ()   01/10/18  13:21    


 


Random Glucose  106 mg/dL ()   01/13/18  07:30    


 


Hemoglobin A1c  6.2 % (4.2-6.5)   01/11/18  06:30    


 


Calcium  8.7 mg/dL (8.4-10.5)   01/13/18  07:30    


 


Phosphorus  4.2 mg/dL (2.5-4.5)   01/11/18  06:30    


 


Magnesium  2.0 mg/dL (1.7-2.2)   01/11/18  06:30    


 


Total Bilirubin  0.2 mg/dL (0.2-1.3)   01/13/18  07:30    


 


AST  33 U/L (14-36)   01/13/18  07:30    


 


ALT  50 U/L (7-56)   01/13/18  07:30    


 


Alkaline Phosphatase  84 U/L ()   01/13/18  07:30    


 


Lactate Dehydrogenase  456 U/L (333-699)   01/11/18  06:30    


 


Total Creatine Kinase  172 U/L ()   01/11/18  06:30    


 


CK-MB (CK-2)  0.8 ng/mL (0.0-3.6)   01/10/18  13:23    


 


CK-MB (CK-2) %  Cancelled   01/10/18  13:23    


 


Troponin I  < 0.01 ng/mL  01/11/18  06:30    


 


NT-Pro-B Natriuret Pep  29.1 pg/mL (0-450)   01/11/18  09:45    


 


Total Protein  6.5 g/dL (5.8-8.3)   01/13/18  07:30    


 


Albumin  3.8 g/dL (3.0-4.8)   01/13/18  07:30    


 


Globulin  2.8 gm/dL  01/13/18  07:30    


 


Albumin/Globulin Ratio  1.4  (1.1-1.8)   01/13/18  07:30    


 


Triglycerides  185 mg/dL ()  H  01/11/18  06:30    


 


Cholesterol  194 mg/dL (130-200)   01/11/18  06:30    


 


LDL Cholesterol Direct  119 mg/dL (0-129)   01/11/18  06:30    


 


HDL Cholesterol  55 mg/dL (29-60)   01/11/18  06:30    


 


TSH 3rd Generation  3.44 mIU/mL (0.46-4.68)   01/11/18  06:30    


 


Urine Color  Yellow  (YELLOW)   01/10/18  18:15    


 


Urine Appearance  Cloudy  (CLEAR)   01/10/18  18:15    


 


Urine pH  7.0  (4.7-8.0)   01/10/18  18:15    


 


Ur Specific Gravity  1.015  (1.005-1.035)   01/10/18  18:15    


 


Urine Protein  Negative mg/dL (<30 mg/dL)   01/10/18  18:15    


 


Urine Glucose (UA)  Negative mg/dL (NEGATIVE)   01/10/18  18:15    


 


Urine Ketones  Negative mg/dL (NEGATIVE)   01/10/18  18:15    


 


Urine Blood  Trace-intact  (NEGATIVE)  H  01/10/18  18:15    


 


Urine Nitrate  Negative  (NEGATIVE)   01/10/18  18:15    


 


Urine Bilirubin  Negative  (NEGATIVE)   01/10/18  18:15    


 


Urine Urobilinogen  0.2 E.U./dL (<1 E.U./dL)   01/10/18  18:15    


 


Ur Leukocyte Esterase  Negative Danyelle/uL (NEGATIVE)   01/10/18  18:15    


 


Urine RBC  1 - 3 /hpf (0-2)   01/10/18  18:15    


 


Urine WBC  0 - 2 /hpf (0-6)   01/10/18  18:15    


 


Ur Epithelial Cells  6 - 8 /hpf (0-5)   01/10/18  18:15    


 


Urine Bacteria  Mod  (NEG)   01/10/18  18:15    














Attending/Attestation





- Attestation


I have personally seen and examined this patient.: Yes


I have fully participated in the care of the patient.: Yes


I have reviewed all pertinent clinical information, including history, physical 

exam and plan: Yes


Notes (Text): 





01/13/18 15:16





Attending note;





Patient seen and examined with resident.





Patient is a 57 year old female with past medical history of chronic fatigue 

syndrome , history of epidural injections , occipital nerve block, chronic 

migraines who presented with complaint of headache, high blood pressure and   

pre syncopal episode.


CT and MRI brain without significant ischemia.


Echocardiogram normal. Carotid Doppler normal.  Neurology and cardiology 

evaluation appreciated.





Hypertension; adequately controlled with Coreg and Diovan.





continue flexaril and topamax.





Anxiety; psychiatric evaluation appreciated. Started on Ativan.





Hypertension; patient was also evaluated by Dr. Garcia. Follow-up as outpatient.





 abdominal ultrasound showed fatty liver. Continue Lipitor.


PT evaluation appreciated. 





Follow-up with Dr. Brittney sesay as outpatient.





Follow-up with PMD of choice.








01/13/18 15:17

## 2018-01-13 NOTE — CON
DATE:



NEPHROLOGY CONSULTATION



HISTORY OF PRESENT ILLNESS:  This 57-year-old female with past medical

history of chronic migraine headaches, fibromyalgia, reported chronic

fatigue syndrome, chronic neck pain and presented to ED yesterday with

complaints of generalized weakness, headaches, shortness of breath; patient

also reporting new onset of increased blood pressure; Nephrology being

consulted for possible secondary hypertension.



The patient reports having normal blood pressure previously; had been

placed on verapamil 120 mg but only as migraine prophylaxis, not as an

antihypertensive agent; however, over the past few days, has been noticing

that she has had elevated blood pressure readings as high as 180/110; the

patient also reports having had chest pain and palpitations lately; the

patient denies any feeling of impending doom; the patient does report

25-pound weight gain over the past 1-1/2 years; denies any change in her

skin tone; the patient also reports relatively new onset of shortness of

breath with decreased exercise tolerance when walking uphill; however, can

walk 1 mile on a flat surface without issues.



The patient went to a new PMDs office earlier this week and was started on

metoprolol XL; however, she did not  the prescription.



PAST MEDICAL HISTORY:  As above, stage IV endometriosis, status post

hysterectomy; L4-L5 fusion; status post cholecystectomy.



FAMILY HISTORY:  Mother breast cancer, father MI.



SOCIAL HISTORY:  Denies smoking.  Social alcohol use.



MEDICATIONS:  At home, Relpax 40 mg as needed, loratadine 5 mg daily,

valacyclovir 2 mg daily, memantine 5 mg daily, naltrexone 1 mg daily,

modafinil 100 mg daily, Glutathione 50 mg weekly, cyclobenzaprine 5 mg

nightly, celecoxib 200 mg twice a day, multiple vitamin supplements.



REVIEW OF SYSTEMS:

CONSTITUTIONAL:  Weight loss as mentioned above.

HEENT:  Denies any visual deficits; however, did have blurry vision

yesterday that has resolved.  Denies any change in her voice

RESPIRATORY:  Reports having had some cough lately.

CARDIOVASCULAR:  Reports palpitations.

EXTREMITIES:  Reports some leg swelling, currently improved.

GI:  Denies any nausea, vomiting.

:  Reports some dysuria.

MUSCULOSKELETAL:  Multiple joint pains, reports stiffness of finger joints

in the morning, improves as day goes along.

PSYCHIATRIC:  History of chronic fatigue syndrome.

SKIN:  Denies any change in skin tone.  No itching or rashes.

NEURO:  Frequent headaches.



PHYSICAL EXAMINATION:

VITAL SIGNS:  This morning, blood pressure 123/68, heart rate 68,

respirations 18, temperature 98, O2 sat 98% on room air.

GENERAL:  No distress.  Conversing coherently in full sentences.

HEENT:  Moist mucous membranes.  Nonicteric.  No cervical lymphadenopathy.

NECK:  No JVD distention.

RESPIRATORY:  Lungs clear to auscultation bilaterally.  No rales.  No

rhonchi.  No wheezes.

CARDIOVASCULAR:  Heart sounds S1, S2 normal.  No murmurs, no gallops, no

rubs.  Regular rate and rhythm.

GI:  Abdomen is soft, nontender, nondistended.

:  No bladder distention.

EXTREMITIES:  No lower leg edema.  No swelling in fingers noted.

MUSCULOSKELETAL:  Normal movement about restraints.

NEURO:  5/5 motor in bilateral upper extremities, 4+/5 in lower

extremities.

PSYCHIATRIC:  Normal mood, normal affect.

NEURO:  No tremor noted.



LABORATORY DATA:  The labs this morning CBC:  WBC 12.1, hemoglobin 13.1,

hematocrit 39.3, platelets 312.



Chemistry panel:  Sodium 140, potassium 3.9, chloride 107, bicarb 22, BUN

13, creatinine 0.6, glucose 140, calcium 9.4, AST 39, ALT 55, hemoglobin

A1c 6.2, albumin 4.0.



ABDOMINAL ULTRASOUND:  Reporting increased echogenicity of the liver

parenchyma.



UA negative protein, trace blood.



ASSESSMENT AND PLAN:

1.  New-onset hypertension concerning for secondary hypertension. 

Differential diagnosis is subclinical Cushing's versus renin-secreting

tumor; no history consistent with pheochromocytoma, but will screen for it

anyway.



We will perform overnight 1 mg dexamethasone suppression test.



We will check plasma aldosterone and renin activity.



We will obtain plasma-free metanephrines.



We will decrease losartan to 50 mg to avoid sudden drop in blood pressure.



2.  Hypokalemia, relatively mild with potassium level of 3.5 yesterday. 

Continue to monitor periodically.



Thank you for this consult.  We will be following up closely.







__________________________________________

Blaise Garcia MD







DD:  01/12/2018 18:23:28

DT:  01/12/2018 18:37:15

Job # [id number]

## 2018-01-13 NOTE — CP.PCM.PN
Subjective





- Date & Time of Evaluation


Date of Evaluation: 01/13/18


Time of Evaluation: 12:00





- Subjective


Subjective: 





Patient again reporting lightheadedness on ambulation today;





Objective





- Vital Signs/Intake and Output


Vital Signs (last 24 hours): 


 











Temp Pulse Resp BP Pulse Ox


 


 97.6 F   87   20   142/92 H  97 


 


 01/13/18 08:43  01/13/18 09:17  01/13/18 08:43  01/13/18 09:17  01/13/18 08:43











- Labs


Labs: 


 





 01/13/18 07:30 





 01/13/18 07:30 





 











PT  11.4 SECONDS (9.4-12.5)   01/10/18  13:23    


 


INR  0.99  (0.93-1.08)   01/10/18  13:23    


 


APTT  30.8 Seconds (25.1-36.5)   01/10/18  13:23    














- Constitutional


Appears: Well, No Acute Distress





- Eye Exam


Eye Exam: Normal appearance.  absent: Scleral icterus





- ENT Exam


ENT Exam: Mucous Membranes Moist





- Respiratory Exam


Respiratory Exam: Clear to Ausculation Bilateral.  absent: Respiratory Distress





- Cardiovascular Exam


Cardiovascular Exam: RRR, +S1, +S2





- GI/Abdominal Exam


GI & Abdominal Exam: Soft.  absent: Distended, Tenderness





- Extremities Exam


Additional comments: 





no leg edema;





- Neurological Exam


Neurological Exam: Alert, Awake





- Psychiatric Exam


Psychiatric exam: Normal Affect, Normal Mood





- Skin


Skin Exam: Warm.  absent: Cyanosis





Assessment and Plan


(1) Hypertension


Assessment & Plan: 


New onset htn, not seen on clinic vitals as recently as Nov 2017; lab workup 

sent to [facility] for possible secondary causes including sub-clinical Cushings and 

pheo; will f/u as outpatient; for now, patient to continue losartan 50 mg daily 

as she responded very well to the med; patient advised that if she remains 

lightheaded, should decrease dose in half;


Status: Acute   





(2) Near syncope


Assessment & Plan: 


Etiology unclear but doesn't seem related to htn unless patient was having 

significant pressure natiuresis with subsequent volume depletion (orthostatic 

changes were recorded 2 days ago, but repeat measurement done today was negative

); see above regarding losartan dose;


Status: Acute

## 2018-01-15 NOTE — PN
DATE:  01/13/2018



ADDENDUM



This patient is stable.  Anxiety is related to the medical issues what

patient had.  If no acute issues, this writer will follow up on this

patient on Monday.  Other than that, treatment plan was discussed with Dr. Flynn in detail.  The patient is not suicidal, not homicidal, not in

any imminent danger to self or others.  If patient needs to be discharged,

there is no contraindication for that from the psychiatric standpoint. 

Should you have any questions, give me a call back.  Thank you so much.





__________________________________________

Claudia Rodriguez MD





DD:  01/13/2018 0:06:43

DT:  01/13/2018 0:07:47

Job # [id number]

## 2018-03-30 PROBLEM — Z00.00 ENCOUNTER FOR PREVENTIVE HEALTH EXAMINATION: Status: ACTIVE | Noted: 2018-03-30

## 2018-05-24 ENCOUNTER — APPOINTMENT (OUTPATIENT)
Dept: HEART AND VASCULAR | Facility: CLINIC | Age: 58
End: 2018-05-24
Payer: COMMERCIAL

## 2018-05-24 VITALS
BODY MASS INDEX: 33.12 KG/M2 | HEIGHT: 64.17 IN | DIASTOLIC BLOOD PRESSURE: 96 MMHG | TEMPERATURE: 97.4 F | SYSTOLIC BLOOD PRESSURE: 152 MMHG | OXYGEN SATURATION: 99 % | WEIGHT: 193.98 LBS | HEART RATE: 97 BPM

## 2018-05-24 DIAGNOSIS — Z82.3 FAMILY HISTORY OF STROKE: ICD-10-CM

## 2018-05-24 DIAGNOSIS — R53.82 CHRONIC FATIGUE, UNSPECIFIED: ICD-10-CM

## 2018-05-24 DIAGNOSIS — I10 ESSENTIAL (PRIMARY) HYPERTENSION: ICD-10-CM

## 2018-05-24 DIAGNOSIS — G43.909 MIGRAINE, UNSPECIFIED, NOT INTRACTABLE, W/OUT STATUS MIGRAINOSUS: ICD-10-CM

## 2018-05-24 DIAGNOSIS — Z82.49 FAMILY HISTORY OF ISCHEMIC HEART DISEASE AND OTHER DISEASES OF THE CIRCULATORY SYSTEM: ICD-10-CM

## 2018-05-24 DIAGNOSIS — Z78.9 OTHER SPECIFIED HEALTH STATUS: ICD-10-CM

## 2018-05-24 DIAGNOSIS — R06.09 OTHER FORMS OF DYSPNEA: ICD-10-CM

## 2018-05-24 DIAGNOSIS — M54.2 CERVICALGIA: ICD-10-CM

## 2018-05-24 DIAGNOSIS — F41.9 ANXIETY DISORDER, UNSPECIFIED: ICD-10-CM

## 2018-05-24 DIAGNOSIS — G43.709 CHRONIC MIGRAINE W/OUT AURA, NOT INTRACTABLE, W/OUT STATUS MIGRAINOSUS: ICD-10-CM

## 2018-05-24 DIAGNOSIS — Z86.79 PERSONAL HISTORY OF OTHER DISEASES OF THE CIRCULATORY SYSTEM: ICD-10-CM

## 2018-05-24 DIAGNOSIS — Z87.898 PERSONAL HISTORY OF OTHER SPECIFIED CONDITIONS: ICD-10-CM

## 2018-05-24 DIAGNOSIS — R07.9 CHEST PAIN, UNSPECIFIED: ICD-10-CM

## 2018-05-24 DIAGNOSIS — Z83.3 FAMILY HISTORY OF DIABETES MELLITUS: ICD-10-CM

## 2018-05-24 DIAGNOSIS — E78.5 HYPERLIPIDEMIA, UNSPECIFIED: ICD-10-CM

## 2018-05-24 DIAGNOSIS — G89.29 CERVICALGIA: ICD-10-CM

## 2018-05-24 DIAGNOSIS — Z80.9 FAMILY HISTORY OF MALIGNANT NEOPLASM, UNSPECIFIED: ICD-10-CM

## 2018-05-24 DIAGNOSIS — Z86.39 PERSONAL HISTORY OF OTHER ENDOCRINE, NUTRITIONAL AND METABOLIC DISEASE: ICD-10-CM

## 2018-05-24 DIAGNOSIS — K21.9 GASTRO-ESOPHAGEAL REFLUX DISEASE W/OUT ESOPHAGITIS: ICD-10-CM

## 2018-05-24 PROCEDURE — 99244 OFF/OP CNSLTJ NEW/EST MOD 40: CPT | Mod: 25

## 2018-05-24 PROCEDURE — 93000 ELECTROCARDIOGRAM COMPLETE: CPT

## 2018-05-24 RX ORDER — MELATONIN 5 MG
5 CAPSULE ORAL
Refills: 0 | Status: ACTIVE | COMMUNITY

## 2018-05-24 RX ORDER — MILNACIPRAN HYDROCHLORIDE 50 MG/1
50 TABLET, FILM COATED ORAL
Refills: 0 | Status: ACTIVE | COMMUNITY

## 2018-05-24 RX ORDER — TOPIRAMATE 50 MG/1
50 TABLET, FILM COATED ORAL TWICE DAILY
Refills: 0 | Status: ACTIVE | COMMUNITY

## 2018-05-24 RX ORDER — MAGNESIUM OXIDE 241.3 MG/1000MG
400 TABLET ORAL DAILY
Refills: 0 | Status: ACTIVE | COMMUNITY

## 2018-05-24 RX ORDER — CYCLOBENZAPRINE HYDROCHLORIDE 10 MG/1
10 TABLET, FILM COATED ORAL DAILY
Refills: 0 | Status: ACTIVE | COMMUNITY

## 2018-05-24 RX ORDER — ELETRIPTAN HYDROBROMIDE 40 MG/1
40 TABLET, FILM COATED ORAL
Refills: 0 | Status: ACTIVE | COMMUNITY

## 2018-05-24 RX ORDER — NALTREXONE HYDROCHLORIDE 50 MG/1
TABLET, FILM COATED ORAL DAILY
Refills: 0 | Status: ACTIVE | COMMUNITY

## 2018-05-24 RX ORDER — TURMERIC ROOT EXTRACT 500 MG
500 TABLET ORAL
Refills: 0 | Status: ACTIVE | COMMUNITY

## 2018-05-24 RX ORDER — DESLORATADINE 5 MG/1
5 TABLET ORAL DAILY
Refills: 0 | Status: ACTIVE | COMMUNITY

## 2018-05-24 RX ORDER — MEMANTINE HYDROCHLORIDE 10 MG/1
10 TABLET, FILM COATED ORAL TWICE DAILY
Refills: 0 | Status: ACTIVE | COMMUNITY

## 2018-05-24 RX ORDER — COLD-HOT PACK
125 MCG EACH MISCELLANEOUS
Refills: 0 | Status: ACTIVE | COMMUNITY

## 2018-05-24 RX ORDER — ACYCLOVIR 400 MG/1
400 TABLET ORAL TWICE DAILY
Refills: 0 | Status: ACTIVE | COMMUNITY

## 2018-05-24 RX ORDER — PANTOPRAZOLE 40 MG/1
40 TABLET, DELAYED RELEASE ORAL DAILY
Refills: 0 | Status: ACTIVE | COMMUNITY

## 2018-05-24 RX ORDER — ATORVASTATIN CALCIUM 40 MG/1
40 TABLET, FILM COATED ORAL DAILY
Refills: 0 | Status: ACTIVE | COMMUNITY

## 2018-05-24 RX ORDER — LORAZEPAM 1 MG/1
1 TABLET ORAL DAILY
Refills: 0 | Status: ACTIVE | COMMUNITY

## 2018-06-13 ENCOUNTER — APPOINTMENT (OUTPATIENT)
Dept: HEART AND VASCULAR | Facility: CLINIC | Age: 58
End: 2018-06-13
Payer: COMMERCIAL

## 2018-06-13 PROCEDURE — 93306 TTE W/DOPPLER COMPLETE: CPT

## 2018-06-18 ENCOUNTER — APPOINTMENT (OUTPATIENT)
Dept: HEART AND VASCULAR | Facility: CLINIC | Age: 58
End: 2018-06-18
Payer: COMMERCIAL

## 2018-06-18 VITALS
BODY MASS INDEX: 32.78 KG/M2 | WEIGHT: 192 LBS | HEIGHT: 64.17 IN | OXYGEN SATURATION: 96 % | DIASTOLIC BLOOD PRESSURE: 95 MMHG | HEART RATE: 95 BPM | TEMPERATURE: 97 F | SYSTOLIC BLOOD PRESSURE: 129 MMHG

## 2018-06-18 PROCEDURE — 94010 BREATHING CAPACITY TEST: CPT | Mod: 59

## 2018-06-18 PROCEDURE — 94729 DIFFUSING CAPACITY: CPT

## 2018-06-18 PROCEDURE — 99214 OFFICE O/P EST MOD 30 MIN: CPT | Mod: 25

## 2018-06-18 PROCEDURE — 93000 ELECTROCARDIOGRAM COMPLETE: CPT | Mod: 59

## 2018-06-18 PROCEDURE — 94621 CARDIOPULM EXERCISE TESTING: CPT

## 2018-06-18 PROCEDURE — 94727 GAS DIL/WSHOT DETER LNG VOL: CPT

## 2018-06-25 ENCOUNTER — TRANSCRIPTION ENCOUNTER (OUTPATIENT)
Age: 58
End: 2018-06-25

## 2018-06-25 ENCOUNTER — APPOINTMENT (OUTPATIENT)
Dept: HEART AND VASCULAR | Facility: CLINIC | Age: 58
End: 2018-06-25
Payer: COMMERCIAL

## 2018-06-25 VITALS
HEIGHT: 64.13 IN | OXYGEN SATURATION: 98 % | SYSTOLIC BLOOD PRESSURE: 118 MMHG | HEART RATE: 94 BPM | WEIGHT: 198 LBS | DIASTOLIC BLOOD PRESSURE: 92 MMHG | BODY MASS INDEX: 33.8 KG/M2 | TEMPERATURE: 98.1 F

## 2018-06-25 DIAGNOSIS — R55 SYNCOPE AND COLLAPSE: ICD-10-CM

## 2018-06-25 PROCEDURE — 99214 OFFICE O/P EST MOD 30 MIN: CPT

## 2018-07-05 ENCOUNTER — APPOINTMENT (OUTPATIENT)
Dept: HEART AND VASCULAR | Facility: CLINIC | Age: 58
End: 2018-07-05
Payer: COMMERCIAL

## 2018-07-05 VITALS
SYSTOLIC BLOOD PRESSURE: 138 MMHG | HEART RATE: 96 BPM | BODY MASS INDEX: 33.8 KG/M2 | OXYGEN SATURATION: 100 % | WEIGHT: 198 LBS | HEIGHT: 64 IN | DIASTOLIC BLOOD PRESSURE: 95 MMHG

## 2018-07-05 PROCEDURE — 99204 OFFICE O/P NEW MOD 45 MIN: CPT

## 2018-08-07 ENCOUNTER — APPOINTMENT (OUTPATIENT)
Age: 58
End: 2018-08-07
Payer: COMMERCIAL

## 2018-08-07 PROCEDURE — 93272 ECG/REVIEW INTERPRET ONLY: CPT

## 2018-08-27 ENCOUNTER — APPOINTMENT (OUTPATIENT)
Dept: HEART AND VASCULAR | Facility: CLINIC | Age: 58
End: 2018-08-27
Payer: COMMERCIAL

## 2018-08-27 VITALS
DIASTOLIC BLOOD PRESSURE: 80 MMHG | WEIGHT: 190 LBS | HEIGHT: 64 IN | HEART RATE: 75 BPM | SYSTOLIC BLOOD PRESSURE: 129 MMHG | BODY MASS INDEX: 32.44 KG/M2

## 2018-08-27 PROCEDURE — 93000 ELECTROCARDIOGRAM COMPLETE: CPT | Mod: 59

## 2018-08-27 PROCEDURE — 99214 OFFICE O/P EST MOD 30 MIN: CPT | Mod: 25

## 2018-08-27 PROCEDURE — 93227 XTRNL ECG REC<48 HR R&I: CPT

## 2020-04-29 ENCOUNTER — TRANSCRIPTION ENCOUNTER (OUTPATIENT)
Age: 60
End: 2020-04-29

## 2021-08-23 ENCOUNTER — RX ONLY (OUTPATIENT)
Age: 61
Setting detail: RX ONLY
End: 2021-08-23

## 2021-08-23 RX ORDER — KETOCONAZOLE 20 MG/ML
SHAMPOO, SUSPENSION TOPICAL
Qty: 1 | Refills: 0 | Status: ERX

## 2021-10-06 PROBLEM — I10 ESSENTIAL HYPERTENSION: Status: ACTIVE | Noted: 2018-05-24

## 2022-05-25 ENCOUNTER — APPOINTMENT (RX ONLY)
Dept: URBAN - METROPOLITAN AREA CLINIC 41 | Facility: CLINIC | Age: 62
Setting detail: DERMATOLOGY
End: 2022-05-25

## 2022-05-25 DIAGNOSIS — Z41.9 ENCOUNTER FOR PROCEDURE FOR PURPOSES OTHER THAN REMEDYING HEALTH STATE, UNSPECIFIED: ICD-10-CM

## 2022-05-25 DIAGNOSIS — L73.8 OTHER SPECIFIED FOLLICULAR DISORDERS: ICD-10-CM | Status: INADEQUATELY CONTROLLED

## 2022-05-25 DIAGNOSIS — L82.1 OTHER SEBORRHEIC KERATOSIS: ICD-10-CM | Status: STABLE

## 2022-05-25 DIAGNOSIS — L71.8 OTHER ROSACEA: ICD-10-CM | Status: INADEQUATELY CONTROLLED

## 2022-05-25 DIAGNOSIS — D18.0 HEMANGIOMA: ICD-10-CM | Status: STABLE

## 2022-05-25 DIAGNOSIS — Q826 OTHER SPECIFIED ANOMALIES OF SKIN: ICD-10-CM | Status: STABLE

## 2022-05-25 DIAGNOSIS — L57.8 OTHER SKIN CHANGES DUE TO CHRONIC EXPOSURE TO NONIONIZING RADIATION: ICD-10-CM | Status: STABLE

## 2022-05-25 DIAGNOSIS — D22 MELANOCYTIC NEVI: ICD-10-CM | Status: STABLE

## 2022-05-25 DIAGNOSIS — Q828 OTHER SPECIFIED ANOMALIES OF SKIN: ICD-10-CM | Status: STABLE

## 2022-05-25 DIAGNOSIS — L73.2 HIDRADENITIS SUPPURATIVA: ICD-10-CM | Status: STABLE

## 2022-05-25 DIAGNOSIS — L72.0 EPIDERMAL CYST: ICD-10-CM | Status: STABLE

## 2022-05-25 DIAGNOSIS — L85.3 XEROSIS CUTIS: ICD-10-CM | Status: STABLE

## 2022-05-25 DIAGNOSIS — Q819 OTHER SPECIFIED ANOMALIES OF SKIN: ICD-10-CM | Status: STABLE

## 2022-05-25 PROBLEM — L85.8 OTHER SPECIFIED EPIDERMAL THICKENING: Status: ACTIVE | Noted: 2022-05-25

## 2022-05-25 PROBLEM — D22.5 MELANOCYTIC NEVI OF TRUNK: Status: ACTIVE | Noted: 2022-05-25

## 2022-05-25 PROBLEM — D48.5 NEOPLASM OF UNCERTAIN BEHAVIOR OF SKIN: Status: ACTIVE | Noted: 2022-05-25

## 2022-05-25 PROBLEM — D18.01 HEMANGIOMA OF SKIN AND SUBCUTANEOUS TISSUE: Status: ACTIVE | Noted: 2022-05-25

## 2022-05-25 PROCEDURE — ? FULL BODY SKIN EXAM

## 2022-05-25 PROCEDURE — 11401 EXC TR-EXT B9+MARG 0.6-1 CM: CPT

## 2022-05-25 PROCEDURE — ? PRESCRIPTION MEDICATION MANAGEMENT

## 2022-05-25 PROCEDURE — ? HYDRAFACIAL

## 2022-05-25 PROCEDURE — ? PRESCRIPTION

## 2022-05-25 PROCEDURE — ? ORDER TESTS

## 2022-05-25 PROCEDURE — ? COSMETIC CONSULTATION: HYDRAFACIAL

## 2022-05-25 PROCEDURE — ? COUNSELING

## 2022-05-25 PROCEDURE — ? ADDITIONAL NOTES

## 2022-05-25 PROCEDURE — ? EXCISION

## 2022-05-25 PROCEDURE — 99214 OFFICE O/P EST MOD 30 MIN: CPT | Mod: 25

## 2022-05-25 PROCEDURE — ? COSMETIC CONSULTATION: HAIR REMOVAL

## 2022-05-25 PROCEDURE — ? TREATMENT REGIMEN

## 2022-05-25 RX ORDER — MUPIROCIN 20 MG/G
OINTMENT TOPICAL
Qty: 22 | Refills: 2 | Status: ERX | COMMUNITY
Start: 2022-05-25

## 2022-05-25 RX ADMIN — MUPIROCIN: 20 OINTMENT TOPICAL at 00:00

## 2022-05-25 ASSESSMENT — LOCATION DETAILED DESCRIPTION DERM
LOCATION DETAILED: RIGHT INFERIOR CENTRAL MALAR CHEEK
LOCATION DETAILED: RIGHT INFERIOR MEDIAL LOWER BACK
LOCATION DETAILED: LEFT POSTERIOR SHOULDER
LOCATION DETAILED: LEFT SUPRAPUBIC SKIN
LOCATION DETAILED: LEFT VENTRAL PROXIMAL FOREARM
LOCATION DETAILED: RIGHT SUPRAPUBIC SKIN
LOCATION DETAILED: LEFT INFERIOR MEDIAL MIDBACK
LOCATION DETAILED: LEFT PROXIMAL POSTERIOR UPPER ARM
LOCATION DETAILED: RIGHT POSTERIOR SHOULDER
LOCATION DETAILED: INFERIOR THORACIC SPINE
LOCATION DETAILED: RIGHT PROXIMAL POSTERIOR UPPER ARM
LOCATION DETAILED: INFERIOR MID FOREHEAD
LOCATION DETAILED: LEFT INFERIOR CENTRAL MALAR CHEEK
LOCATION DETAILED: RIGHT PROXIMAL PRETIBIAL REGION
LOCATION DETAILED: LEFT DISTAL PRETIBIAL REGION
LOCATION DETAILED: LEFT CENTRAL MALAR CHEEK

## 2022-05-25 ASSESSMENT — LOCATION SIMPLE DESCRIPTION DERM
LOCATION SIMPLE: LEFT LOWER BACK
LOCATION SIMPLE: LEFT CHEEK
LOCATION SIMPLE: LEFT FOREARM
LOCATION SIMPLE: INFERIOR FOREHEAD
LOCATION SIMPLE: RIGHT LOWER BACK
LOCATION SIMPLE: RIGHT PRETIBIAL REGION
LOCATION SIMPLE: RIGHT CHEEK
LOCATION SIMPLE: GROIN
LOCATION SIMPLE: UPPER BACK
LOCATION SIMPLE: LEFT POSTERIOR UPPER ARM
LOCATION SIMPLE: LEFT PRETIBIAL REGION
LOCATION SIMPLE: RIGHT SHOULDER
LOCATION SIMPLE: RIGHT POSTERIOR UPPER ARM
LOCATION SIMPLE: LEFT SHOULDER

## 2022-05-25 ASSESSMENT — LOCATION ZONE DERM
LOCATION ZONE: FACE
LOCATION ZONE: ARM
LOCATION ZONE: LEG
LOCATION ZONE: TRUNK

## 2022-05-25 NOTE — PROCEDURE: HYDRAFACIAL
Comments: Patient said she felt some tingling in the skin with the peel that was a 2 on a scale of 1-10 for intensity. There was mild-moderate erythema and mild excess heat in the cheeks for the entire treatment. <10 manual extractions were performed on the cheeks using cotton tipped applicators and a sterile needle.  \\n\\nProducts Used: Antioxidant Gentle Foamy Cleanser, Triple Antioxidant Cream, Phyto Corrective Gel, Triple Lipid Restore, and RE Sheer Physical SPF 50.

## 2022-05-25 NOTE — PROCEDURE: HYDRAFACIAL
Price (Use Numbers Only, No Special Characters Or $): 883 Price (Use Numbers Only, No Special Characters Or $): 335

## 2022-05-25 NOTE — PROCEDURE: EXCISION
Peng Advancement Flap Text: The defect edges were debeveled with a #15 scalpel blade.  Given the location of the defect, shape of the defect and the proximity to free margins a Peng advancement flap was deemed most appropriate.  Using a sterile surgical marker, an appropriate advancement flap was drawn incorporating the defect and placing the expected incisions within the relaxed skin tension lines where possible. The area thus outlined was incised deep to adipose tissue with a #15 scalpel blade.  The skin margins were undermined to an appropriate distance in all directions utilizing iris scissors.
Estimated Blood Loss (Cc): minimal
Patient Will Remove Sutures At Home?: No
Bilateral Helical Rim Advancement Flap Text: The defect edges were debeveled with a #15 blade scalpel.  Given the location of the defect and the proximity to free margins (helical rim) a bilateral helical rim advancement flap was deemed most appropriate.  Using a sterile surgical marker, the appropriate advancement flaps were drawn incorporating the defect and placing the expected incisions between the helical rim and antihelix where possible.  The area thus outlined was incised through and through with a #15 scalpel blade.  With a skin hook and iris scissors, the flaps were gently and sharply undermined and freed up.
Complex Repair And Ftsg Text: The defect edges were debeveled with a #15 scalpel blade.  The primary defect was closed partially with a complex linear closure.  Given the location of the defect, shape of the defect and the proximity to free margins a full thickness skin graft was deemed most appropriate to repair the remaining defect.  The graft was trimmed to fit the size of the remaining defect.  The graft was then placed in the primary defect, oriented appropriately, and sutured into place.
Complex Repair And O-L Flap Text: The defect edges were debeveled with a #15 scalpel blade.  The primary defect was closed partially with a complex linear closure.  Given the location of the remaining defect, shape of the defect and the proximity to free margins an O-L flap was deemed most appropriate for complete closure of the defect.  Using a sterile surgical marker, an appropriate flap was drawn incorporating the defect and placing the expected incisions within the relaxed skin tension lines where possible.    The area thus outlined was incised deep to adipose tissue with a #15 scalpel blade.  The skin margins were undermined to an appropriate distance in all directions utilizing iris scissors.
Advancement Flap (Single) Text: The defect edges were debeveled with a #15 scalpel blade.  Given the location of the defect and the proximity to free margins a single advancement flap was deemed most appropriate.  Using a sterile surgical marker, an appropriate advancement flap was drawn incorporating the defect and placing the expected incisions within the relaxed skin tension lines where possible.    The area thus outlined was incised deep to adipose tissue with a #15 scalpel blade.  The skin margins were undermined to an appropriate distance in all directions utilizing iris scissors.
Island Pedicle Flap-Requiring Vessel Identification Text: The defect edges were debeveled with a #15 scalpel blade.  Given the location of the defect, shape of the defect and the proximity to free margins an island pedicle advancement flap was deemed most appropriate.  Using a sterile surgical marker, an appropriate advancement flap was drawn, based on the axial vessel mentioned above, incorporating the defect, outlining the appropriate donor tissue and placing the expected incisions within the relaxed skin tension lines where possible.    The area thus outlined was incised deep to adipose tissue with a #15 scalpel blade.  The skin margins were undermined to an appropriate distance in all directions around the primary defect and laterally outward around the island pedicle utilizing iris scissors.  There was minimal undermining beneath the pedicle flap.
Suturegard Body: The suture ends were repeatedly re-tightened and re-clamped to achieve the desired tissue expansion.
No Repair - Repaired With Adjacent Surgical Defect Text (Leave Blank If You Do Not Want): After the excision the defect was repaired concurrently with another surgical defect which was in close approximation.
Modified Advancement Flap Text: The defect edges were debeveled with a #15 scalpel blade.  Given the location of the defect, shape of the defect and the proximity to free margins a modified advancement flap was deemed most appropriate.  Using a sterile surgical marker, an appropriate advancement flap was drawn incorporating the defect and placing the expected incisions within the relaxed skin tension lines where possible.    The area thus outlined was incised deep to adipose tissue with a #15 scalpel blade.  The skin margins were undermined to an appropriate distance in all directions utilizing iris scissors.
Rhombic Flap Text: The defect edges were debeveled with a #15 scalpel blade.  Given the location of the defect and the proximity to free margins a rhombic flap was deemed most appropriate.  Using a sterile surgical marker, an appropriate rhombic flap was drawn incorporating the defect.    The area thus outlined was incised deep to adipose tissue with a #15 scalpel blade.  The skin margins were undermined to an appropriate distance in all directions utilizing iris scissors.
Nostril Rim Text: The closure involved the nostril rim.
Show Repair Anesthesia Variables: Yes
Billing Type: Third-Party Bill
Trilobed Flap Text: The defect edges were debeveled with a #15 scalpel blade.  Given the location of the defect and the proximity to free margins a trilobed flap was deemed most appropriate.  Using a sterile surgical marker, an appropriate trilobed flap drawn around the defect.    The area thus outlined was incised deep to adipose tissue with a #15 scalpel blade.  The skin margins were undermined to an appropriate distance in all directions utilizing iris scissors.
Epidermal Closure: running
Advancement-Rotation Flap Text: The defect edges were debeveled with a #15 scalpel blade.  Given the location of the defect, shape of the defect and the proximity to free margins an advancement-rotation flap was deemed most appropriate.  Using a sterile surgical marker, an appropriate flap was drawn incorporating the defect and placing the expected incisions within the relaxed skin tension lines where possible. The area thus outlined was incised deep to adipose tissue with a #15 scalpel blade.  The skin margins were undermined to an appropriate distance in all directions utilizing iris scissors.
Advancement Flap (Double) Text: The defect edges were debeveled with a #15 scalpel blade.  Given the location of the defect and the proximity to free margins a double advancement flap was deemed most appropriate.  Using a sterile surgical marker, the appropriate advancement flaps were drawn incorporating the defect and placing the expected incisions within the relaxed skin tension lines where possible.    The area thus outlined was incised deep to adipose tissue with a #15 scalpel blade.  The skin margins were undermined to an appropriate distance in all directions utilizing iris scissors.
O-T Advancement Flap Text: The defect edges were debeveled with a #15 scalpel blade.  Given the location of the defect, shape of the defect and the proximity to free margins an O-T advancement flap was deemed most appropriate.  Using a sterile surgical marker, an appropriate advancement flap was drawn incorporating the defect and placing the expected incisions within the relaxed skin tension lines where possible.    The area thus outlined was incised deep to adipose tissue with a #15 scalpel blade.  The skin margins were undermined to an appropriate distance in all directions utilizing iris scissors.
Helical Rim Text: The closure involved the helical rim.
Nasalis-Muscle-Based Myocutaneous Island Pedicle Flap Text: Using a #15 blade, an incision was made around the donor flap to the level of the nasalis muscle. Wide lateral undermining was then performed in both the subcutaneous plane above the nasalis muscle, and in a submuscular plane just above periosteum. This allowed the formation of a free nasalis muscle axial pedicle (based on the angular artery) which was still attached to the actual cutaneous flap, increasing its mobility and vascular viability. Hemostasis was obtained with pinpoint electrocoagulation. The flap was mobilized into position and the pivotal anchor points positioned and stabilized with buried interrupted sutures. Subcutaneous and dermal tissues were closed in a multilayered fashion with sutures. Tissue redundancies were excised, and the epidermal edges were apposed without significant tension and sutured with sutures.
Xenograft Text: The defect edges were debeveled with a #15 scalpel blade.  Given the location of the defect, shape of the defect and the proximity to free margins a xenograft was deemed most appropriate.  The graft was then trimmed to fit the size of the defect.  The graft was then placed in the primary defect and oriented appropriately.
Alar Island Pedicle Flap Text: The defect edges were debeveled with a #15 scalpel blade.  Given the location of the defect, shape of the defect and the proximity to the alar rim an island pedicle advancement flap was deemed most appropriate.  Using a sterile surgical marker, an appropriate advancement flap was drawn incorporating the defect, outlining the appropriate donor tissue and placing the expected incisions within the nasal ala running parallel to the alar rim. The area thus outlined was incised with a #15 scalpel blade.  The skin margins were undermined minimally to an appropriate distance in all directions around the primary defect and laterally outward around the island pedicle utilizing iris scissors.  There was minimal undermining beneath the pedicle flap.
O-L Flap Text: The defect edges were debeveled with a #15 scalpel blade.  Given the location of the defect, shape of the defect and the proximity to free margins an O-L flap was deemed most appropriate.  Using a sterile surgical marker, an appropriate advancement flap was drawn incorporating the defect and placing the expected incisions within the relaxed skin tension lines where possible.    The area thus outlined was incised deep to adipose tissue with a #15 scalpel blade.  The skin margins were undermined to an appropriate distance in all directions utilizing iris scissors.
Star Wedge Flap Text: The defect edges were debeveled with a #15 scalpel blade.  Given the location of the defect, shape of the defect and the proximity to free margins a star wedge flap was deemed most appropriate.  Using a sterile surgical marker, an appropriate rotation flap was drawn incorporating the defect and placing the expected incisions within the relaxed skin tension lines where possible. The area thus outlined was incised deep to adipose tissue with a #15 scalpel blade.  The skin margins were undermined to an appropriate distance in all directions utilizing iris scissors.
Hemostasis: Aluminum Chloride
Length To Time In Minutes Device Was In Place: 10
Skin Substitute Units (Will Override Primary Defect Units If Greater Than 0): 0
Dressing: Band-Aid
Muscle Hinge Flap Text: The defect edges were debeveled with a #15 scalpel blade.  Given the size, depth and location of the defect and the proximity to free margins a muscle hinge flap was deemed most appropriate.  Using a sterile surgical marker, an appropriate hinge flap was drawn incorporating the defect. The area thus outlined was incised with a #15 scalpel blade.  The skin margins were undermined to an appropriate distance in all directions utilizing iris scissors.
Bilobed Flap Text: The defect edges were debeveled with a #15 scalpel blade.  Given the location of the defect and the proximity to free margins a bilobe flap was deemed most appropriate.  Using a sterile surgical marker, an appropriate bilobe flap drawn around the defect.    The area thus outlined was incised deep to adipose tissue with a #15 scalpel blade.  The skin margins were undermined to an appropriate distance in all directions utilizing iris scissors.
Melolabial Transposition Flap Text: The defect edges were debeveled with a #15 scalpel blade.  Given the location of the defect and the proximity to free margins a melolabial flap was deemed most appropriate.  Using a sterile surgical marker, an appropriate melolabial transposition flap was drawn incorporating the defect.    The area thus outlined was incised deep to adipose tissue with a #15 scalpel blade.  The skin margins were undermined to an appropriate distance in all directions utilizing iris scissors.
Lazy S Intermediate Repair Preamble Text (Leave Blank If You Do Not Want): Undermining was performed with blunt dissection.
Complex Repair And Dermal Autograft Text: The defect edges were debeveled with a #15 scalpel blade.  The primary defect was closed partially with a complex linear closure.  Given the location of the defect, shape of the defect and the proximity to free margins an dermal autograft was deemed most appropriate to repair the remaining defect.  The graft was trimmed to fit the size of the remaining defect.  The graft was then placed in the primary defect, oriented appropriately, and sutured into place.
Lab Facility: [id number]
Complex Repair Preamble Text (Leave Blank If You Do Not Want): Extensive wide undermining was performed.
Complex Repair And Xenograft Text: The defect edges were debeveled with a #15 scalpel blade.  The primary defect was closed partially with a complex linear closure.  Given the location of the defect, shape of the defect and the proximity to free margins a xenograft was deemed most appropriate to repair the remaining defect.  The graft was trimmed to fit the size of the remaining defect.  The graft was then placed in the primary defect, oriented appropriately, and sutured into place.
Number Of Hemigard Strips Per Side: 1
Banner Transposition Flap Text: The defect edges were debeveled with a #15 scalpel blade.  Given the location of the defect and the proximity to free margins a Banner transposition flap was deemed most appropriate.  Using a sterile surgical marker, an appropriate flap drawn around the defect. The area thus outlined was incised deep to adipose tissue with a #15 scalpel blade.  The skin margins were undermined to an appropriate distance in all directions utilizing iris scissors.
Eye Clamp Note Details: An eye clamp was used during the procedure.
Ear Star Wedge Flap Text: The defect edges were debeveled with a #15 blade scalpel.  Given the location of the defect and the proximity to free margins (helical rim) an ear star wedge flap was deemed most appropriate.  Using a sterile surgical marker, the appropriate flap was drawn incorporating the defect and placing the expected incisions between the helical rim and antihelix where possible.  The area thus outlined was incised through and through with a #15 scalpel blade.
Keystone Flap Text: The defect edges were debeveled with a #15 scalpel blade.  Given the location of the defect, shape of the defect a keystone flap was deemed most appropriate.  Using a sterile surgical marker, an appropriate keystone flap was drawn incorporating the defect, outlining the appropriate donor tissue and placing the expected incisions within the relaxed skin tension lines where possible. The area thus outlined was incised deep to adipose tissue with a #15 scalpel blade.  The skin margins were undermined to an appropriate distance in all directions around the primary defect and laterally outward around the flap utilizing iris scissors.
Interpolation Flap Text: A decision was made to reconstruct the defect utilizing an interpolation axial flap and a staged reconstruction.  A telfa template was made of the defect.  This telfa template was then used to outline the interpolation flap.  The donor area for the pedicle flap was then injected with anesthesia.  The flap was excised through the skin and subcutaneous tissue down to the layer of the underlying musculature.  The interpolation flap was carefully excised within this deep plane to maintain its blood supply.  The edges of the donor site were undermined.   The donor site was closed in a primary fashion.  The pedicle was then rotated into position and sutured.  Once the tube was sutured into place, adequate blood supply was confirmed with blanching and refill.  The pedicle was then wrapped with xeroform gauze and dressed appropriately with a telfa and gauze bandage to ensure continued blood supply and protect the attached pedicle.
Composite Graft Text: The defect edges were debeveled with a #15 scalpel blade.  Given the location of the defect, shape of the defect, the proximity to free margins and the fact the defect was full thickness a composite graft was deemed most appropriate.  The defect was outline and then transferred to the donor site.  A full thickness graft was then excised from the donor site. The graft was then placed in the primary defect, oriented appropriately and then sutured into place.  The secondary defect was then repaired using a primary closure.
Complex Repair And Transposition Flap Text: The defect edges were debeveled with a #15 scalpel blade.  The primary defect was closed partially with a complex linear closure.  Given the location of the remaining defect, shape of the defect and the proximity to free margins a transposition flap was deemed most appropriate for complete closure of the defect.  Using a sterile surgical marker, an appropriate advancement flap was drawn incorporating the defect and placing the expected incisions within the relaxed skin tension lines where possible.    The area thus outlined was incised deep to adipose tissue with a #15 scalpel blade.  The skin margins were undermined to an appropriate distance in all directions utilizing iris scissors.
Complex Repair And Burow's Graft Text: The defect edges were debeveled with a #15 scalpel blade.  The primary defect was closed partially with a complex linear closure.  Given the location of the defect, shape of the defect, the proximity to free margins and the presence of a standing cone deformity a Burow's graft was deemed most appropriate to repair the remaining defect.  The graft was trimmed to fit the size of the remaining defect.  The graft was then placed in the primary defect, oriented appropriately, and sutured into place.
Suturegard Intro: Intraoperative tissue expansion was performed, utilizing the SUTUREGARD device, in order to reduce wound tension.
Mercedes Flap Text: The defect edges were debeveled with a #15 scalpel blade.  Given the location of the defect, shape of the defect and the proximity to free margins a Mercedes flap was deemed most appropriate.  Using a sterile surgical marker, an appropriate advancement flap was drawn incorporating the defect and placing the expected incisions within the relaxed skin tension lines where possible. The area thus outlined was incised deep to adipose tissue with a #15 scalpel blade.  The skin margins were undermined to an appropriate distance in all directions utilizing iris scissors.
Tissue Cultured Epidermal Autograft Text: The defect edges were debeveled with a #15 scalpel blade.  Given the location of the defect, shape of the defect and the proximity to free margins a tissue cultured epidermal autograft was deemed most appropriate.  The graft was then trimmed to fit the size of the defect.  The graft was then placed in the primary defect and oriented appropriately.
Repair Performed By Another Provider Text (Leave Blank If You Do Not Want): After the tissue was excised the defect was repaired by another provider.
Mucosal Advancement Flap Text: Given the location of the defect, shape of the defect and the proximity to free margins a mucosal advancement flap was deemed most appropriate. Incisions were made with a 15 blade scalpel in the appropriate fashion along the cutaneous vermilion border and the mucosal lip. The remaining actinically damaged mucosal tissue was excised.  The mucosal advancement flap was then elevated to the gingival sulcus with care taken to preserve the neurovascular structures and advanced into the primary defect. Care was taken to ensure that precise realignment of the vermilion border was achieved.
Dermal Autograft Text: The defect edges were debeveled with a #15 scalpel blade.  Given the location of the defect, shape of the defect and the proximity to free margins a dermal autograft was deemed most appropriate.  Using a sterile surgical marker, the primary defect shape was transferred to the donor site. The area thus outlined was incised deep to adipose tissue with a #15 scalpel blade.  The harvested graft was then trimmed of adipose and epidermal tissue until only dermis was left.  The skin graft was then placed in the primary defect and oriented appropriately.
Medical Necessity Information: It is in your best interest to select a reason for this procedure from the list below. All of these items fulfill various CMS LCD requirements except lesion extends to a margin.
V-Y Flap Text: The defect edges were debeveled with a #15 scalpel blade.  Given the location of the defect, shape of the defect and the proximity to free margins a V-Y flap was deemed most appropriate.  Using a sterile surgical marker, an appropriate advancement flap was drawn incorporating the defect and placing the expected incisions within the relaxed skin tension lines where possible.    The area thus outlined was incised deep to adipose tissue with a #15 scalpel blade.  The skin margins were undermined to an appropriate distance in all directions utilizing iris scissors.
Excisional Biopsy Additional Text (Leave Blank If You Do Not Want): The margin was drawn around the clinically apparent lesion. An elliptical shape was then drawn on the skin incorporating the lesion and margins.  Incisions were then made along these lines to the appropriate tissue plane and the lesion was extirpated.
Z Plasty Text: The lesion was extirpated to the level of the fat with a #15 scalpel blade.  Given the location of the defect, shape of the defect and the proximity to free margins a Z-plasty was deemed most appropriate for repair.  Using a sterile surgical marker, the appropriate transposition arms of the Z-plasty were drawn incorporating the defect and placing the expected incisions within the relaxed skin tension lines where possible.    The area thus outlined was incised deep to adipose tissue with a #15 scalpel blade.  The skin margins were undermined to an appropriate distance in all directions utilizing iris scissors.  The opposing transposition arms were then transposed into place in opposite direction and anchored with interrupted buried subcutaneous sutures.
Hemigard Postcare Instructions: The HEMIGARD strips are to remain completely dry for at least 5-7 days.
Eliptical Excision Additional Text (Leave Blank If You Do Not Want): The margin was drawn around the clinically apparent lesion.  An elliptical shape was then drawn on the skin incorporating the lesion and margins.  Incisions were then made along these lines to the appropriate tissue plane and the lesion was extirpated.
V-Y Plasty Text: The defect edges were debeveled with a #15 scalpel blade.  Given the location of the defect, shape of the defect and the proximity to free margins an V-Y advancement flap was deemed most appropriate.  Using a sterile surgical marker, an appropriate advancement flap was drawn incorporating the defect and placing the expected incisions within the relaxed skin tension lines where possible.    The area thus outlined was incised deep to adipose tissue with a #15 scalpel blade.  The skin margins were undermined to an appropriate distance in all directions utilizing iris scissors.
Island Pedicle Flap With Canthal Suspension Text: The defect edges were debeveled with a #15 scalpel blade.  Given the location of the defect, shape of the defect and the proximity to free margins an island pedicle advancement flap was deemed most appropriate.  Using a sterile surgical marker, an appropriate advancement flap was drawn incorporating the defect, outlining the appropriate donor tissue and placing the expected incisions within the relaxed skin tension lines where possible. The area thus outlined was incised deep to adipose tissue with a #15 scalpel blade.  The skin margins were undermined to an appropriate distance in all directions around the primary defect and laterally outward around the island pedicle utilizing iris scissors.  There was minimal undermining beneath the pedicle flap. A suspension suture was placed in the canthal tendon to prevent tension and prevent ectropion.
Cartilage Graft Text: The defect edges were debeveled with a #15 scalpel blade.  Given the location of the defect, shape of the defect, the fact the defect involved a full thickness cartilage defect a cartilage graft was deemed most appropriate.  An appropriate donor site was identified, cleansed, and anesthetized. The cartilage graft was then harvested and transferred to the recipient site, oriented appropriately and then sutured into place.  The secondary defect was then repaired using a primary closure.
Complex Repair And Melolabial Flap Text: The defect edges were debeveled with a #15 scalpel blade.  The primary defect was closed partially with a complex linear closure.  Given the location of the remaining defect, shape of the defect and the proximity to free margins a melolabial flap was deemed most appropriate for complete closure of the defect.  Using a sterile surgical marker, an appropriate advancement flap was drawn incorporating the defect and placing the expected incisions within the relaxed skin tension lines where possible.    The area thus outlined was incised deep to adipose tissue with a #15 scalpel blade.  The skin margins were undermined to an appropriate distance in all directions utilizing iris scissors.
Excision Depth: adipose tissue
Saucerization Excision Additional Text (Leave Blank If You Do Not Want): The margin was drawn around the clinically apparent lesion.  Incisions were then made along these lines, in a tangential fashion, to the appropriate tissue plane and the lesion was extirpated.
Complex Repair And Rhombic Flap Text: The defect edges were debeveled with a #15 scalpel blade.  The primary defect was closed partially with a complex linear closure.  Given the location of the remaining defect, shape of the defect and the proximity to free margins a rhombic flap was deemed most appropriate for complete closure of the defect.  Using a sterile surgical marker, an appropriate advancement flap was drawn incorporating the defect and placing the expected incisions within the relaxed skin tension lines where possible.    The area thus outlined was incised deep to adipose tissue with a #15 scalpel blade.  The skin margins were undermined to an appropriate distance in all directions utilizing iris scissors.
Rhomboid Transposition Flap Text: The defect edges were debeveled with a #15 scalpel blade.  Given the location of the defect and the proximity to free margins a rhomboid transposition flap was deemed most appropriate.  Using a sterile surgical marker, an appropriate rhomboid flap was drawn incorporating the defect.    The area thus outlined was incised deep to adipose tissue with a #15 scalpel blade.  The skin margins were undermined to an appropriate distance in all directions utilizing iris scissors.
Hemigard Retention Suture: 2-0 Nylon
Complex Repair And Modified Advancement Flap Text: The defect edges were debeveled with a #15 scalpel blade.  The primary defect was closed partially with a complex linear closure.  Given the location of the remaining defect, shape of the defect and the proximity to free margins a modified advancement flap was deemed most appropriate for complete closure of the defect.  Using a sterile surgical marker, an appropriate advancement flap was drawn incorporating the defect and placing the expected incisions within the relaxed skin tension lines where possible.    The area thus outlined was incised deep to adipose tissue with a #15 scalpel blade.  The skin margins were undermined to an appropriate distance in all directions utilizing iris scissors.
Debridement Text: The wound edges were debrided prior to proceeding with the closure to facilitate wound healing.
Cheek-To-Nose Interpolation Flap Text: A decision was made to reconstruct the defect utilizing an interpolation axial flap and a staged reconstruction.  A telfa template was made of the defect.  This telfa template was then used to outline the Cheek-To-Nose Interpolation flap.  The donor area for the pedicle flap was then injected with anesthesia.  The flap was excised through the skin and subcutaneous tissue down to the layer of the underlying musculature.  The interpolation flap was carefully excised within this deep plane to maintain its blood supply.  The edges of the donor site were undermined.   The donor site was closed in a primary fashion.  The pedicle was then rotated into position and sutured.  Once the tube was sutured into place, adequate blood supply was confirmed with blanching and refill.  The pedicle was then wrapped with xeroform gauze and dressed appropriately with a telfa and gauze bandage to ensure continued blood supply and protect the attached pedicle.
Complex Repair And Single Advancement Flap Text: The defect edges were debeveled with a #15 scalpel blade.  The primary defect was closed partially with a complex linear closure.  Given the location of the remaining defect, shape of the defect and the proximity to free margins a single advancement flap was deemed most appropriate for complete closure of the defect.  Using a sterile surgical marker, an appropriate advancement flap was drawn incorporating the defect and placing the expected incisions within the relaxed skin tension lines where possible.    The area thus outlined was incised deep to adipose tissue with a #15 scalpel blade.  The skin margins were undermined to an appropriate distance in all directions utilizing iris scissors.
Dorsal Nasal Flap Text: The defect edges were debeveled with a #15 scalpel blade.  Given the location of the defect and the proximity to free margins a dorsal nasal flap was deemed most appropriate.  Using a sterile surgical marker, an appropriate dorsal nasal flap was drawn around the defect.    The area thus outlined was incised deep to adipose tissue with a #15 scalpel blade.  The skin margins were undermined to an appropriate distance in all directions utilizing iris scissors.
Epidermal Sutures: 4-0 Nylon
Posterior Auricular Interpolation Flap Text: A decision was made to reconstruct the defect utilizing an interpolation axial flap and a staged reconstruction.  A telfa template was made of the defect.  This telfa template was then used to outline the posterior auricular interpolation flap.  The donor area for the pedicle flap was then injected with anesthesia.  The flap was excised through the skin and subcutaneous tissue down to the layer of the underlying musculature.  The pedicle flap was carefully excised within this deep plane to maintain its blood supply.  The edges of the donor site were undermined.   The donor site was closed in a primary fashion.  The pedicle was then rotated into position and sutured.  Once the tube was sutured into place, adequate blood supply was confirmed with blanching and refill.  The pedicle was then wrapped with xeroform gauze and dressed appropriately with a telfa and gauze bandage to ensure continued blood supply and protect the attached pedicle.
Epidermal Closure Graft Donor Site (Optional): simple interrupted
Consent was obtained from the patient. The risks and benefits to therapy were discussed in detail. Specifically, the risks of infection, scarring, bleeding, prolonged wound healing, incomplete removal, allergy to anesthesia, nerve injury and recurrence were addressed. All components of Universal Protocol/PAUSE Rule completed.
Where Do You Want The Question To Include Opioid Counseling Located?: Case Summary Tab
Skin Substitute Text: The defect edges were debeveled with a #15 scalpel blade.  Given the location of the defect, shape of the defect and the proximity to free margins a skin substitute graft was deemed most appropriate.  The graft material was trimmed to fit the size of the defect. The graft was then placed in the primary defect and oriented appropriately.
O-Z Plasty Text: The defect edges were debeveled with a #15 scalpel blade.  Given the location of the defect, shape of the defect and the proximity to free margins an O-Z plasty (double transposition flap) was deemed most appropriate.  Using a sterile surgical marker, the appropriate transposition flaps were drawn incorporating the defect and placing the expected incisions within the relaxed skin tension lines where possible.    The area thus outlined was incised deep to adipose tissue with a #15 scalpel blade.  The skin margins were undermined to an appropriate distance in all directions utilizing iris scissors.  Hemostasis was achieved with electrocautery.  The flaps were then transposed into place, one clockwise and the other counterclockwise, and anchored with interrupted buried subcutaneous sutures.
Complex Repair And A-T Advancement Flap Text: The defect edges were debeveled with a #15 scalpel blade.  The primary defect was closed partially with a complex linear closure.  Given the location of the remaining defect, shape of the defect and the proximity to free margins an A-T advancement flap was deemed most appropriate for complete closure of the defect.  Using a sterile surgical marker, an appropriate advancement flap was drawn incorporating the defect and placing the expected incisions within the relaxed skin tension lines where possible.    The area thus outlined was incised deep to adipose tissue with a #15 scalpel blade.  The skin margins were undermined to an appropriate distance in all directions utilizing iris scissors.
Size Of Margin In Cm: 0.1
Anesthesia Type: 1% lidocaine with 1:100,000 epinephrine
Excision Method: Round
Purse String (Simple) Text: Given the location of the defect and the characteristics of the surrounding skin a purse string simple closure was deemed most appropriate.  Undermining was performed circumferentially around the surgical defect.  A purse string suture was then placed and tightened.
Transposition Flap Text: The defect edges were debeveled with a #15 scalpel blade.  Given the location of the defect and the proximity to free margins a transposition flap was deemed most appropriate.  Using a sterile surgical marker, an appropriate transposition flap was drawn incorporating the defect.    The area thus outlined was incised deep to adipose tissue with a #15 scalpel blade.  The skin margins were undermined to an appropriate distance in all directions utilizing iris scissors.
Complex Repair And W Plasty Text: The defect edges were debeveled with a #15 scalpel blade.  The primary defect was closed partially with a complex linear closure.  Given the location of the remaining defect, shape of the defect and the proximity to free margins a W plasty was deemed most appropriate for complete closure of the defect.  Using a sterile surgical marker, an appropriate advancement flap was drawn incorporating the defect and placing the expected incisions within the relaxed skin tension lines where possible.    The area thus outlined was incised deep to adipose tissue with a #15 scalpel blade.  The skin margins were undermined to an appropriate distance in all directions utilizing iris scissors.
Wound Care: Vaseline
Zygomaticofacial Flap Text: Given the location of the defect, shape of the defect and the proximity to free margins a zygomaticofacial flap was deemed most appropriate for repair.  Using a sterile surgical marker, the appropriate flap was drawn incorporating the defect and placing the expected incisions within the relaxed skin tension lines where possible. The area thus outlined was incised deep to adipose tissue with a #15 scalpel blade with preservation of a vascular pedicle.  The skin margins were undermined to an appropriate distance in all directions utilizing iris scissors.  The flap was then placed into the defect and anchored with interrupted buried subcutaneous sutures.
Crescentic Advancement Flap Text: The defect edges were debeveled with a #15 scalpel blade.  Given the location of the defect and the proximity to free margins a crescentic advancement flap was deemed most appropriate.  Using a sterile surgical marker, the appropriate advancement flap was drawn incorporating the defect and placing the expected incisions within the relaxed skin tension lines where possible.    The area thus outlined was incised deep to adipose tissue with a #15 scalpel blade.  The skin margins were undermined to an appropriate distance in all directions utilizing iris scissors.
Deep Sutures: 4-0 Polysorb
Detail Level: Detailed
Size Of Lesion In Cm: 0.4
Anesthesia Type: 1% lidocaine with epinephrine
Path Notes (To The Dermatopathologist): no prev path
Home Suture Removal Text: Patient was provided a home suture removal kit and will remove their sutures at home.  If they have any questions or difficulties they will call the office.
Burow's Graft Text: The defect edges were debeveled with a #15 scalpel blade.  Given the location of the defect, shape of the defect, the proximity to free margins and the presence of a standing cone deformity a Burow's skin graft was deemed most appropriate. The standing cone was removed and this tissue was then trimmed to the shape of the primary defect. The adipose tissue was also removed until only dermis and epidermis were left.  The skin margins of the secondary defect were undermined to an appropriate distance in all directions utilizing iris scissors.  The secondary defect was closed with interrupted buried subcutaneous sutures.  The skin edges were then re-apposed with running  sutures.  The skin graft was then placed in the primary defect and oriented appropriately.
Complex Repair And M Plasty Text: The defect edges were debeveled with a #15 scalpel blade.  The primary defect was closed partially with a complex linear closure.  Given the location of the remaining defect, shape of the defect and the proximity to free margins an M plasty was deemed most appropriate for complete closure of the defect.  Using a sterile surgical marker, an appropriate advancement flap was drawn incorporating the defect and placing the expected incisions within the relaxed skin tension lines where possible.    The area thus outlined was incised deep to adipose tissue with a #15 scalpel blade.  The skin margins were undermined to an appropriate distance in all directions utilizing iris scissors.
Lip Wedge Excision Repair Text: Given the location of the defect and the proximity to free margins a full thickness wedge repair was deemed most appropriate.  Using a sterile surgical marker, the appropriate repair was drawn incorporating the defect and placing the expected incisions perpendicular to the vermilion border.  The vermilion border was also meticulously outlined to ensure appropriate reapproximation during the repair.  The area thus outlined was incised through and through with a #15 scalpel blade.  The muscularis and dermis were reaproximated with deep sutures following hemostasis. Care was taken to realign the vermilion border before proceeding with the superficial closure.  Once the vermilion was realigned the superfical and mucosal closure was finished.
Vermilion Border Text: The closure involved the vermilion border.
Retention Suture Bite Size: 3 mm
Perilesional Excision Additional Text (Leave Blank If You Do Not Want): The margin was drawn around the clinically apparent lesion. Incisions were then made along these lines to the appropriate tissue plane and the lesion was extirpated.
O-T Plasty Text: The defect edges were debeveled with a #15 scalpel blade.  Given the location of the defect, shape of the defect and the proximity to free margins an O-T plasty was deemed most appropriate.  Using a sterile surgical marker, an appropriate O-T plasty was drawn incorporating the defect and placing the expected incisions within the relaxed skin tension lines where possible.    The area thus outlined was incised deep to adipose tissue with a #15 scalpel blade.  The skin margins were undermined to an appropriate distance in all directions utilizing iris scissors.
Post-Care Instructions: I reviewed with the patient in detail post-care instructions. Patient is not to engage in any heavy lifting, exercise, or swimming for the next 14 days. Should the patient develop any fevers, chills, bleeding, severe pain patient will contact the office immediately.
Mastoid Interpolation Flap Text: A decision was made to reconstruct the defect utilizing an interpolation axial flap and a staged reconstruction.  A telfa template was made of the defect.  This telfa template was then used to outline the mastoid interpolation flap.  The donor area for the pedicle flap was then injected with anesthesia.  The flap was excised through the skin and subcutaneous tissue down to the layer of the underlying musculature.  The pedicle flap was carefully excised within this deep plane to maintain its blood supply.  The edges of the donor site were undermined.   The donor site was closed in a primary fashion.  The pedicle was then rotated into position and sutured.  Once the tube was sutured into place, adequate blood supply was confirmed with blanching and refill.  The pedicle was then wrapped with xeroform gauze and dressed appropriately with a telfa and gauze bandage to ensure continued blood supply and protect the attached pedicle.
Rotation Flap Text: The defect edges were debeveled with a #15 scalpel blade.  Given the location of the defect, shape of the defect and the proximity to free margins a rotation flap was deemed most appropriate.  Using a sterile surgical marker, an appropriate rotation flap was drawn incorporating the defect and placing the expected incisions within the relaxed skin tension lines where possible.    The area thus outlined was incised deep to adipose tissue with a #15 scalpel blade.  The skin margins were undermined to an appropriate distance in all directions utilizing iris scissors.
H Plasty Text: Given the location of the defect, shape of the defect and the proximity to free margins a H-plasty was deemed most appropriate for repair.  Using a sterile surgical marker, the appropriate advancement arms of the H-plasty were drawn incorporating the defect and placing the expected incisions within the relaxed skin tension lines where possible. The area thus outlined was incised deep to adipose tissue with a #15 scalpel blade. The skin margins were undermined to an appropriate distance in all directions utilizing iris scissors.  The opposing advancement arms were then advanced into place in opposite direction and anchored with interrupted buried subcutaneous sutures.
Slit Excision Additional Text (Leave Blank If You Do Not Want): A linear line was drawn on the skin overlying the lesion. An incision was made slowly until the lesion was visualized.  Once visualized, the lesion was removed with blunt dissection.
Fusiform Excision Additional Text (Leave Blank If You Do Not Want): The margin was drawn around the clinically apparent lesion.  A fusiform shape was then drawn on the skin incorporating the lesion and margins.  Incisions were then made along these lines to the appropriate tissue plane and the lesion was extirpated.
Complex Repair And O-T Advancement Flap Text: The defect edges were debeveled with a #15 scalpel blade.  The primary defect was closed partially with a complex linear closure.  Given the location of the remaining defect, shape of the defect and the proximity to free margins an O-T advancement flap was deemed most appropriate for complete closure of the defect.  Using a sterile surgical marker, an appropriate advancement flap was drawn incorporating the defect and placing the expected incisions within the relaxed skin tension lines where possible.    The area thus outlined was incised deep to adipose tissue with a #15 scalpel blade.  The skin margins were undermined to an appropriate distance in all directions utilizing iris scissors.
Bilobed Transposition Flap Text: The defect edges were debeveled with a #15 scalpel blade.  Given the location of the defect and the proximity to free margins a bilobed transposition flap was deemed most appropriate.  Using a sterile surgical marker, an appropriate bilobe flap drawn around the defect.    The area thus outlined was incised deep to adipose tissue with a #15 scalpel blade.  The skin margins were undermined to an appropriate distance in all directions utilizing iris scissors.
Double Island Pedicle Flap Text: The defect edges were debeveled with a #15 scalpel blade.  Given the location of the defect, shape of the defect and the proximity to free margins a double island pedicle advancement flap was deemed most appropriate.  Using a sterile surgical marker, an appropriate advancement flap was drawn incorporating the defect, outlining the appropriate donor tissue and placing the expected incisions within the relaxed skin tension lines where possible.    The area thus outlined was incised deep to adipose tissue with a #15 scalpel blade.  The skin margins were undermined to an appropriate distance in all directions around the primary defect and laterally outward around the island pedicle utilizing iris scissors.  There was minimal undermining beneath the pedicle flap.
Island Pedicle Flap Text: The defect edges were debeveled with a #15 scalpel blade.  Given the location of the defect, shape of the defect and the proximity to free margins an island pedicle advancement flap was deemed most appropriate.  Using a sterile surgical marker, an appropriate advancement flap was drawn incorporating the defect, outlining the appropriate donor tissue and placing the expected incisions within the relaxed skin tension lines where possible.    The area thus outlined was incised deep to adipose tissue with a #15 scalpel blade.  The skin margins were undermined to an appropriate distance in all directions around the primary defect and laterally outward around the island pedicle utilizing iris scissors.  There was minimal undermining beneath the pedicle flap.
Lab: [id number]
Complex Repair And Tissue Cultured Epidermal Autograft Text: The defect edges were debeveled with a #15 scalpel blade.  The primary defect was closed partially with a complex linear closure.  Given the location of the defect, shape of the defect and the proximity to free margins an tissue cultured epidermal autograft was deemed most appropriate to repair the remaining defect.  The graft was trimmed to fit the size of the remaining defect.  The graft was then placed in the primary defect, oriented appropriately, and sutured into place.
Complex Repair And Double M Plasty Text: The defect edges were debeveled with a #15 scalpel blade.  The primary defect was closed partially with a complex linear closure.  Given the location of the remaining defect, shape of the defect and the proximity to free margins a double M plasty was deemed most appropriate for complete closure of the defect.  Using a sterile surgical marker, an appropriate advancement flap was drawn incorporating the defect and placing the expected incisions within the relaxed skin tension lines where possible.    The area thus outlined was incised deep to adipose tissue with a #15 scalpel blade.  The skin margins were undermined to an appropriate distance in all directions utilizing iris scissors.
Hatchet Flap Text: The defect edges were debeveled with a #15 scalpel blade.  Given the location of the defect, shape of the defect and the proximity to free margins a hatchet flap was deemed most appropriate.  Using a sterile surgical marker, an appropriate hatchet flap was drawn incorporating the defect and placing the expected incisions within the relaxed skin tension lines where possible.    The area thus outlined was incised deep to adipose tissue with a #15 scalpel blade.  The skin margins were undermined to an appropriate distance in all directions utilizing iris scissors.
Burow's Advancement Flap Text: The defect edges were debeveled with a #15 scalpel blade.  Given the location of the defect and the proximity to free margins a Burow's advancement flap was deemed most appropriate.  Using a sterile surgical marker, the appropriate advancement flap was drawn incorporating the defect and placing the expected incisions within the relaxed skin tension lines where possible.    The area thus outlined was incised deep to adipose tissue with a #15 scalpel blade.  The skin margins were undermined to an appropriate distance in all directions utilizing iris scissors.
Split-Thickness Skin Graft Text: The defect edges were debeveled with a #15 scalpel blade.  Given the location of the defect, shape of the defect and the proximity to free margins a split thickness skin graft was deemed most appropriate.  Using a sterile surgical marker, the primary defect shape was transferred to the donor site. The split thickness graft was then harvested.  The skin graft was then placed in the primary defect and oriented appropriately.
Helical Rim Advancement Flap Text: The defect edges were debeveled with a #15 blade scalpel.  Given the location of the defect and the proximity to free margins (helical rim) a double helical rim advancement flap was deemed most appropriate.  Using a sterile surgical marker, the appropriate advancement flaps were drawn incorporating the defect and placing the expected incisions between the helical rim and antihelix where possible.  The area thus outlined was incised through and through with a #15 scalpel blade.  With a skin hook and iris scissors, the flaps were gently and sharply undermined and freed up.
Repair Type: None - Secondary Intention
Ftsg Text: The defect edges were debeveled with a #15 scalpel blade.  Given the location of the defect, shape of the defect and the proximity to free margins a full thickness skin graft was deemed most appropriate.  Using a sterile surgical marker, the primary defect shape was transferred to the donor site. The area thus outlined was incised deep to adipose tissue with a #15 scalpel blade.  The harvested graft was then trimmed of adipose tissue until only dermis and epidermis was left.  The skin margins of the secondary defect were undermined to an appropriate distance in all directions utilizing iris scissors.  The secondary defect was closed with interrupted buried subcutaneous sutures.  The skin edges were then re-apposed with running  sutures.  The skin graft was then placed in the primary defect and oriented appropriately.
Chonodrocutaneous Helical Advancement Flap Text: The defect edges were debeveled with a #15 scalpel blade.  Given the location of the defect and the proximity to free margins a chondrocutaneous helical advancement flap was deemed most appropriate.  Using a sterile surgical marker, the appropriate advancement flap was drawn incorporating the defect and placing the expected incisions within the relaxed skin tension lines where possible.    The area thus outlined was incised deep to adipose tissue with a #15 scalpel blade.  The skin margins were undermined to an appropriate distance in all directions utilizing iris scissors.
Complex Repair And Epidermal Autograft Text: The defect edges were debeveled with a #15 scalpel blade.  The primary defect was closed partially with a complex linear closure.  Given the location of the defect, shape of the defect and the proximity to free margins an epidermal autograft was deemed most appropriate to repair the remaining defect.  The graft was trimmed to fit the size of the remaining defect.  The graft was then placed in the primary defect, oriented appropriately, and sutured into place.
Information: Selecting Yes will display possible errors in your note based on the variables you have selected. This validation is only offered as a suggestion for you. PLEASE NOTE THAT THE VALIDATION TEXT WILL BE REMOVED WHEN YOU FINALIZE YOUR NOTE. IF YOU WANT TO FAX A PRELIMINARY NOTE YOU WILL NEED TO TOGGLE THIS TO 'NO' IF YOU DO NOT WANT IT IN YOUR FAXED NOTE.
Medical Necessity Clause: This procedure was medically necessary because the lesion that was treated was:
Cheek Interpolation Flap Text: A decision was made to reconstruct the defect utilizing an interpolation axial flap and a staged reconstruction.  A telfa template was made of the defect.  This telfa template was then used to outline the Cheek Interpolation flap.  The donor area for the pedicle flap was then injected with anesthesia.  The flap was excised through the skin and subcutaneous tissue down to the layer of the underlying musculature.  The interpolation flap was carefully excised within this deep plane to maintain its blood supply.  The edges of the donor site were undermined.   The donor site was closed in a primary fashion.  The pedicle was then rotated into position and sutured.  Once the tube was sutured into place, adequate blood supply was confirmed with blanching and refill.  The pedicle was then wrapped with xeroform gauze and dressed appropriately with a telfa and gauze bandage to ensure continued blood supply and protect the attached pedicle.
Complex Repair And Z Plasty Text: The defect edges were debeveled with a #15 scalpel blade.  The primary defect was closed partially with a complex linear closure.  Given the location of the remaining defect, shape of the defect and the proximity to free margins a Z plasty was deemed most appropriate for complete closure of the defect.  Using a sterile surgical marker, an appropriate advancement flap was drawn incorporating the defect and placing the expected incisions within the relaxed skin tension lines where possible.    The area thus outlined was incised deep to adipose tissue with a #15 scalpel blade.  The skin margins were undermined to an appropriate distance in all directions utilizing iris scissors.
Complex Repair And Split-Thickness Skin Graft Text: The defect edges were debeveled with a #15 scalpel blade.  The primary defect was closed partially with a complex linear closure.  Given the location of the defect, shape of the defect and the proximity to free margins a split thickness skin graft was deemed most appropriate to repair the remaining defect.  The graft was trimmed to fit the size of the remaining defect.  The graft was then placed in the primary defect, oriented appropriately, and sutured into place.
Paramedian Forehead Flap Text: A decision was made to reconstruct the defect utilizing an interpolation axial flap and a staged reconstruction.  A telfa template was made of the defect.  This telfa template was then used to outline the paramedian forehead pedicle flap.  The donor area for the pedicle flap was then injected with anesthesia.  The flap was excised through the skin and subcutaneous tissue down to the layer of the underlying musculature.  The pedicle flap was carefully excised within this deep plane to maintain its blood supply.  The edges of the donor site were undermined.   The donor site was closed in a primary fashion.  The pedicle was then rotated into position and sutured.  Once the tube was sutured into place, adequate blood supply was confirmed with blanching and refill.  The pedicle was then wrapped with xeroform gauze and dressed appropriately with a telfa and gauze bandage to ensure continued blood supply and protect the attached pedicle.
Complex Repair And Dorsal Nasal Flap Text: The defect edges were debeveled with a #15 scalpel blade.  The primary defect was closed partially with a complex linear closure.  Given the location of the remaining defect, shape of the defect and the proximity to free margins a dorsal nasal flap was deemed most appropriate for complete closure of the defect.  Using a sterile surgical marker, an appropriate flap was drawn incorporating the defect and placing the expected incisions within the relaxed skin tension lines where possible.    The area thus outlined was incised deep to adipose tissue with a #15 scalpel blade.  The skin margins were undermined to an appropriate distance in all directions utilizing iris scissors.
Complex Repair And V-Y Plasty Text: The defect edges were debeveled with a #15 scalpel blade.  The primary defect was closed partially with a complex linear closure.  Given the location of the remaining defect, shape of the defect and the proximity to free margins a V-Y plasty was deemed most appropriate for complete closure of the defect.  Using a sterile surgical marker, an appropriate advancement flap was drawn incorporating the defect and placing the expected incisions within the relaxed skin tension lines where possible.    The area thus outlined was incised deep to adipose tissue with a #15 scalpel blade.  The skin margins were undermined to an appropriate distance in all directions utilizing iris scissors.
Melolabial Interpolation Flap Text: A decision was made to reconstruct the defect utilizing an interpolation axial flap and a staged reconstruction.  A telfa template was made of the defect.  This telfa template was then used to outline the melolabial interpolation flap.  The donor area for the pedicle flap was then injected with anesthesia.  The flap was excised through the skin and subcutaneous tissue down to the layer of the underlying musculature.  The pedicle flap was carefully excised within this deep plane to maintain its blood supply.  The edges of the donor site were undermined.   The donor site was closed in a primary fashion.  The pedicle was then rotated into position and sutured.  Once the tube was sutured into place, adequate blood supply was confirmed with blanching and refill.  The pedicle was then wrapped with xeroform gauze and dressed appropriately with a telfa and gauze bandage to ensure continued blood supply and protect the attached pedicle.
Nasal Turnover Hinge Flap Text: The defect edges were debeveled with a #15 scalpel blade.  Given the size, depth, location of the defect and the defect being full thickness a nasal turnover hinge flap was deemed most appropriate.  Using a sterile surgical marker, an appropriate hinge flap was drawn incorporating the defect. The area thus outlined was incised with a #15 scalpel blade. The flap was designed to recreate the nasal mucosal lining and the alar rim. The skin margins were undermined to an appropriate distance in all directions utilizing iris scissors.
W Plasty Text: The lesion was extirpated to the level of the fat with a #15 scalpel blade.  Given the location of the defect, shape of the defect and the proximity to free margins a W-plasty was deemed most appropriate for repair.  Using a sterile surgical marker, the appropriate transposition arms of the W-plasty were drawn incorporating the defect and placing the expected incisions within the relaxed skin tension lines where possible.    The area thus outlined was incised deep to adipose tissue with a #15 scalpel blade.  The skin margins were undermined to an appropriate distance in all directions utilizing iris scissors.  The opposing transposition arms were then transposed into place in opposite direction and anchored with interrupted buried subcutaneous sutures.
Spiral Flap Text: The defect edges were debeveled with a #15 scalpel blade.  Given the location of the defect, shape of the defect and the proximity to free margins a spiral flap was deemed most appropriate.  Using a sterile surgical marker, an appropriate rotation flap was drawn incorporating the defect and placing the expected incisions within the relaxed skin tension lines where possible. The area thus outlined was incised deep to adipose tissue with a #15 scalpel blade.  The skin margins were undermined to an appropriate distance in all directions utilizing iris scissors.
Double O-Z Plasty Text: The defect edges were debeveled with a #15 scalpel blade.  Given the location of the defect, shape of the defect and the proximity to free margins a Double O-Z plasty (double transposition flap) was deemed most appropriate.  Using a sterile surgical marker, the appropriate transposition flaps were drawn incorporating the defect and placing the expected incisions within the relaxed skin tension lines where possible. The area thus outlined was incised deep to adipose tissue with a #15 scalpel blade.  The skin margins were undermined to an appropriate distance in all directions utilizing iris scissors.  Hemostasis was achieved with electrocautery.  The flaps were then transposed into place, one clockwise and the other counterclockwise, and anchored with interrupted buried subcutaneous sutures.
Purse String (Intermediate) Text: Given the location of the defect and the characteristics of the surrounding skin a purse string intermediate closure was deemed most appropriate.  Undermining was performed circumferentially around the surgical defect.  A purse string suture was then placed and tightened.
Orbicularis Oris Muscle Flap Text: The defect edges were debeveled with a #15 scalpel blade.  Given that the defect affected the competency of the oral sphincter an orbicularis oris muscle flap was deemed most appropriate to restore this competency and normal muscle function.  Using a sterile surgical marker, an appropriate flap was drawn incorporating the defect. The area thus outlined was incised with a #15 scalpel blade.
Complex Repair And Skin Substitute Graft Text: The defect edges were debeveled with a #15 scalpel blade.  The primary defect was closed partially with a complex linear closure.  Given the location of the remaining defect, shape of the defect and the proximity to free margins a skin substitute graft was deemed most appropriate to repair the remaining defect.  The graft was trimmed to fit the size of the remaining defect.  The graft was then placed in the primary defect, oriented appropriately, and sutured into place.
O-Z Flap Text: The defect edges were debeveled with a #15 scalpel blade.  Given the location of the defect, shape of the defect and the proximity to free margins an O-Z flap was deemed most appropriate.  Using a sterile surgical marker, an appropriate transposition flap was drawn incorporating the defect and placing the expected incisions within the relaxed skin tension lines where possible. The area thus outlined was incised deep to adipose tissue with a #15 scalpel blade.  The skin margins were undermined to an appropriate distance in all directions utilizing iris scissors.
Hemigard Intro: Due to skin fragility and wound tension, it was decided to use HEMIGARD adhesive retention suture devices to permit a linear closure. The skin was cleaned and dried for a 6cm distance away from the wound. Excessive hair, if present, was removed to allow for adhesion.
A-T Advancement Flap Text: The defect edges were debeveled with a #15 scalpel blade.  Given the location of the defect, shape of the defect and the proximity to free margins an A-T advancement flap was deemed most appropriate.  Using a sterile surgical marker, an appropriate advancement flap was drawn incorporating the defect and placing the expected incisions within the relaxed skin tension lines where possible.    The area thus outlined was incised deep to adipose tissue with a #15 scalpel blade.  The skin margins were undermined to an appropriate distance in all directions utilizing iris scissors.
Retention Suture Text: Retention sutures were placed to support the closure and prevent dehiscence.
Scalpel Size: 15 blade
Epidermal Autograft Text: The defect edges were debeveled with a #15 scalpel blade.  Given the location of the defect, shape of the defect and the proximity to free margins an epidermal autograft was deemed most appropriate.  Using a sterile surgical marker, the primary defect shape was transferred to the donor site. The epidermal graft was then harvested.  The skin graft was then placed in the primary defect and oriented appropriately.
Anesthesia Volume In Cc: 0.5
Undermining Type: Entire Wound
Complex Repair And Double Advancement Flap Text: The defect edges were debeveled with a #15 scalpel blade.  The primary defect was closed partially with a complex linear closure.  Given the location of the remaining defect, shape of the defect and the proximity to free margins a double advancement flap was deemed most appropriate for complete closure of the defect.  Using a sterile surgical marker, an appropriate advancement flap was drawn incorporating the defect and placing the expected incisions within the relaxed skin tension lines where possible.    The area thus outlined was incised deep to adipose tissue with a #15 scalpel blade.  The skin margins were undermined to an appropriate distance in all directions utilizing iris scissors.
Complex Repair And Rotation Flap Text: The defect edges were debeveled with a #15 scalpel blade.  The primary defect was closed partially with a complex linear closure.  Given the location of the remaining defect, shape of the defect and the proximity to free margins a rotation flap was deemed most appropriate for complete closure of the defect.  Using a sterile surgical marker, an appropriate advancement flap was drawn incorporating the defect and placing the expected incisions within the relaxed skin tension lines where possible.    The area thus outlined was incised deep to adipose tissue with a #15 scalpel blade.  The skin margins were undermined to an appropriate distance in all directions utilizing iris scissors.
Bi-Rhombic Flap Text: The defect edges were debeveled with a #15 scalpel blade.  Given the location of the defect and the proximity to free margins a bi-rhombic flap was deemed most appropriate.  Using a sterile surgical marker, an appropriate rhombic flap was drawn incorporating the defect. The area thus outlined was incised deep to adipose tissue with a #15 scalpel blade.  The skin margins were undermined to an appropriate distance in all directions utilizing iris scissors.
Complex Repair And Bilobe Flap Text: The defect edges were debeveled with a #15 scalpel blade.  The primary defect was closed partially with a complex linear closure.  Given the location of the remaining defect, shape of the defect and the proximity to free margins a bilobe flap was deemed most appropriate for complete closure of the defect.  Using a sterile surgical marker, an appropriate advancement flap was drawn incorporating the defect and placing the expected incisions within the relaxed skin tension lines where possible.    The area thus outlined was incised deep to adipose tissue with a #15 scalpel blade.  The skin margins were undermined to an appropriate distance in all directions utilizing iris scissors.
Double O-Z Flap Text: The defect edges were debeveled with a #15 scalpel blade.  Given the location of the defect, shape of the defect and the proximity to free margins a Double O-Z flap was deemed most appropriate.  Using a sterile surgical marker, an appropriate transposition flap was drawn incorporating the defect and placing the expected incisions within the relaxed skin tension lines where possible. The area thus outlined was incised deep to adipose tissue with a #15 scalpel blade.  The skin margins were undermined to an appropriate distance in all directions utilizing iris scissors.
Positioning (Leave Blank If You Do Not Want): The patient was placed in a comfortable position exposing the surgical site.
Mustarde Flap Text: The defect edges were debeveled with a #15 scalpel blade.  Given the size, depth and location of the defect and the proximity to free margins a Mustarde flap was deemed most appropriate.  Using a sterile surgical marker, an appropriate flap was drawn incorporating the defect. The area thus outlined was incised with a #15 scalpel blade.  The skin margins were undermined to an appropriate distance in all directions utilizing iris scissors.
Adjacent Tissue Transfer Text: The defect edges were debeveled with a #15 scalpel blade.  Given the location of the defect and the proximity to free margins an adjacent tissue transfer was deemed most appropriate.  Using a sterile surgical marker, an appropriate flap was drawn incorporating the defect and placing the expected incisions within the relaxed skin tension lines where possible.    The area thus outlined was incised deep to adipose tissue with a #15 scalpel blade.  The skin margins were undermined to an appropriate distance in all directions utilizing iris scissors.
Staged Advancement Flap Text: The defect edges were debeveled with a #15 scalpel blade.  Given the location of the defect, shape of the defect and the proximity to free margins a staged advancement flap was deemed most appropriate.  Using a sterile surgical marker, an appropriate advancement flap was drawn incorporating the defect and placing the expected incisions within the relaxed skin tension lines where possible. The area thus outlined was incised deep to adipose tissue with a #15 scalpel blade.  The skin margins were undermined to an appropriate distance in all directions utilizing iris scissors.

## 2022-05-25 NOTE — PROCEDURE: COUNSELING
Detail Level: Zone
Sunscreen Recommendations: spf 30+
Detail Level: Generalized
Moisturizer Recommendations: Amlactin
Detail Level: Simple
Patient Specific Counseling (Will Not Stick From Patient To Patient): NG notes this looks stable and nothing concerning, however, if the patient would like to get this removed, she can have it surgically excised. 8mm x 8mm.
Detail Level: Detailed
Patient Specific Counseling (Will Not Stick From Patient To Patient): This is very bothersome to patient, NG notes we can have this spot surgically excised.

## 2022-05-25 NOTE — HPI: EVALUATION OF SKIN LESION(S)
Hpi Title: Evaluation of Skin Lesions
Additional History: Est pt last here March 2021, new to [facility], here for fbse, no history of skin cancer\\n\\nBumps on backs of arms and arms which she can’t get rid of.\\n\\nBump underneath the skin on back of right shoulder. \\n\\nPatient has been getting ingrown hairs on groin.

## 2022-05-25 NOTE — PROCEDURE: ORDER TESTS
Billing Type: Third-Party Bill
Expected Date Of Service: 05/25/2022
Bill For Surgical Tray: no
Performing Laboratory: 0

## 2022-05-25 NOTE — PROCEDURE: ADDITIONAL NOTES
Render Risk Assessment In Note?: no
Additional Notes: Pt asked at checkout for LHR quotes and information. I discussed LHR for upper lip and chin would need 6-8 sessions and each session is $250. I discussed trimming hair short 2 days prior to laser.
Detail Level: Simple

## 2022-05-31 RX ORDER — OXYMETAZOLINE HYDROCHLORIDE 1 G/100G
CREAM TOPICAL
Qty: 30 | Refills: 4 | Status: ERX | COMMUNITY
Start: 2022-05-31

## 2022-05-31 RX ORDER — IVERMECTIN 10 MG/G
CREAM TOPICAL
Qty: 45 | Refills: 4 | Status: ERX | COMMUNITY
Start: 2022-05-31

## 2022-05-31 RX ADMIN — OXYMETAZOLINE HYDROCHLORIDE: 1 CREAM TOPICAL at 00:00

## 2022-05-31 RX ADMIN — IVERMECTIN: 10 CREAM TOPICAL at 00:00

## 2022-07-07 ENCOUNTER — APPOINTMENT (RX ONLY)
Dept: URBAN - METROPOLITAN AREA CLINIC 41 | Facility: CLINIC | Age: 62
Setting detail: DERMATOLOGY
End: 2022-07-07

## 2022-07-07 DIAGNOSIS — Z41.9 ENCOUNTER FOR PROCEDURE FOR PURPOSES OTHER THAN REMEDYING HEALTH STATE, UNSPECIFIED: ICD-10-CM

## 2022-07-07 PROCEDURE — ? GENTLEMAX

## 2022-07-07 PROCEDURE — ? ADDITIONAL NOTES

## 2022-07-07 ASSESSMENT — LOCATION SIMPLE DESCRIPTION DERM
LOCATION SIMPLE: CHIN
LOCATION SIMPLE: UPPER LIP

## 2022-07-07 ASSESSMENT — LOCATION ZONE DERM
LOCATION ZONE: FACE
LOCATION ZONE: LIP

## 2022-07-07 ASSESSMENT — LOCATION DETAILED DESCRIPTION DERM
LOCATION DETAILED: PHILTRUM
LOCATION DETAILED: RIGHT CHIN

## 2022-07-07 NOTE — PROCEDURE: GENTLEMAX
Price (Use Numbers Only, No Special Characters Or $): 451 Price (Use Numbers Only, No Special Characters Or $): 076

## 2022-07-07 NOTE — PROCEDURE: ADDITIONAL NOTES
Detail Level: Simple
Additional Notes: Patient consent was obtained to proceed with the visit and recommended plan of care after discussion of all risks and benefits, including the risks of COVID-19 exposure.
Additional Notes: Pt has cyst on right upper back. LC notes she can remove it, scheduled pt for sx.
Render Risk Assessment In Note?: no

## 2022-07-11 ENCOUNTER — APPOINTMENT (RX ONLY)
Dept: URBAN - METROPOLITAN AREA CLINIC 41 | Facility: CLINIC | Age: 62
Setting detail: DERMATOLOGY
End: 2022-07-11

## 2022-07-11 DIAGNOSIS — L72.8 OTHER FOLLICULAR CYSTS OF THE SKIN AND SUBCUTANEOUS TISSUE: ICD-10-CM | Status: INADEQUATELY CONTROLLED

## 2022-07-11 PROBLEM — D48.5 NEOPLASM OF UNCERTAIN BEHAVIOR OF SKIN: Status: ACTIVE | Noted: 2022-07-11

## 2022-07-11 PROCEDURE — ? EXCISION

## 2022-07-11 PROCEDURE — ? ADDITIONAL NOTES

## 2022-07-11 PROCEDURE — 13121 CMPLX RPR S/A/L 2.6-7.5 CM: CPT

## 2022-07-11 PROCEDURE — 11403 EXC TR-EXT B9+MARG 2.1-3CM: CPT

## 2022-07-11 ASSESSMENT — LOCATION ZONE DERM: LOCATION ZONE: ARM

## 2022-07-11 ASSESSMENT — LOCATION SIMPLE DESCRIPTION DERM: LOCATION SIMPLE: RIGHT SHOULDER

## 2022-07-11 ASSESSMENT — LOCATION DETAILED DESCRIPTION DERM: LOCATION DETAILED: RIGHT POSTERIOR SHOULDER

## 2022-07-11 NOTE — HPI: CYST
Is This A New Presentation, Or A Follow-Up?: Follow Up Cyst
Additional History: Patient is here for surgery

## 2022-07-11 NOTE — PROCEDURE: EXCISION
Medical Necessity Information: It is in your best interest to select a reason for this procedure from the list below. All of these items fulfill various CMS LCD requirements except lesion extends to a margin.
Include Z78.9 (Other Specified Conditions Influencing Health Status) As An Associated Diagnosis?: No
Medical Necessity Clause: This procedure was medically necessary because the lesion that was treated was:
Lab: 6
Lab Facility: 3
Surgeon (Optional): Celi Noonan PA-C
Surgeon Performing Repair (Optional): Celi Noonan PA-C
Size Of Lesion In Cm: 2.2
X Size Of Lesion In Cm (Optional): 0
Size Of Margin In Cm: 0.1
Excision Method: Round
Saucerization Depth: dermis and superficial adipose tissue
Repair Type: Complex
Suturegard Retention Suture: 2-0 Nylon
Retention Suture Bite Size: 3 mm
Length To Time In Minutes Device Was In Place: 10
Number Of Hemigard Strips Per Side: 1
Intermediate / Complex Repair - Final Wound Length In Cm: 3.2
Complex Requirements: Extensive Undermining Performed?: Yes
Width Of Defect Perpendicular To Closure In Cm (Required): 2.4
Undermining Type: Entire Wound
Debridement Text: The wound edges were debrided prior to proceeding with the closure to facilitate wound healing.
Helical Rim Text: The closure involved the helical rim.
Vermilion Border Text: The closure involved the vermilion border.
Nostril Rim Text: The closure involved the nostril rim.
Retention Suture Text: Retention sutures were placed to support the closure and prevent dehiscence.
Suture Removal: 14 days
Epidermal Closure Graft Donor Site (Optional): simple interrupted
Graft Donor Site Bandage (Optional-Leave Blank If You Don't Want In Note): Steri-strips and a pressure bandage were applied to the donor site.
Detail Level: Detailed
Excision Depth: adipose tissue
Scalpel Size: 15 blade
Anesthesia Type: 1% lidocaine with epinephrine
Hemostasis: Electrocautery
Estimated Blood Loss (Cc): minimal
Deep Sutures: 4-0 Polysorb
Epidermal Sutures: 4-0 Ethilon
Epidermal Closure: running
Wound Care: Petrolatum
Dressing: dry sterile dressing
Suturegard Intro: Intraoperative tissue expansion was performed, utilizing the SUTUREGARD device, in order to reduce wound tension.
Suturegard Body: The suture ends were repeatedly re-tightened and re-clamped to achieve the desired tissue expansion.
Hemigard Intro: Due to skin fragility and wound tension, it was decided to use HEMIGARD adhesive retention suture devices to permit a linear closure. The skin was cleaned and dried for a 6cm distance away from the wound. Excessive hair, if present, was removed to allow for adhesion.
Hemigard Postcare Instructions: The HEMIGARD strips are to remain completely dry for at least 5-7 days.
Complex Repair Preamble Text (Leave Blank If You Do Not Want): Extensive wide undermining was performed.
Intermediate Repair Preamble Text (Leave Blank If You Do Not Want): Undermining was performed with blunt dissection.
Fusiform Excision Additional Text (Leave Blank If You Do Not Want): The margin was drawn around the clinically apparent lesion.  A fusiform shape was then drawn on the skin incorporating the lesion and margins.  Incisions were then made along these lines to the appropriate tissue plane and the lesion was extirpated.
Eliptical Excision Additional Text (Leave Blank If You Do Not Want): The margin was drawn around the clinically apparent lesion.  An elliptical shape was then drawn on the skin incorporating the lesion and margins.  Incisions were then made along these lines to the appropriate tissue plane and the lesion was extirpated.
Saucerization Excision Additional Text (Leave Blank If You Do Not Want): The margin was drawn around the clinically apparent lesion.  Incisions were then made along these lines, in a tangential fashion, to the appropriate tissue plane and the lesion was extirpated.
Slit Excision Additional Text (Leave Blank If You Do Not Want): A linear line was drawn on the skin overlying the lesion. An incision was made slowly until the lesion was visualized.  Once visualized, the lesion was removed with blunt dissection.
Excisional Biopsy Additional Text (Leave Blank If You Do Not Want): The margin was drawn around the clinically apparent lesion. An elliptical shape was then drawn on the skin incorporating the lesion and margins.  Incisions were then made along these lines to the appropriate tissue plane and the lesion was extirpated.
Perilesional Excision Additional Text (Leave Blank If You Do Not Want): The margin was drawn around the clinically apparent lesion. Incisions were then made along these lines to the appropriate tissue plane and the lesion was extirpated.
Repair Performed By Another Provider Text (Leave Blank If You Do Not Want): After the tissue was excised the defect was repaired by another provider.
No Repair - Repaired With Adjacent Surgical Defect Text (Leave Blank If You Do Not Want): After the excision the defect was repaired concurrently with another surgical defect which was in close approximation.
Adjacent Tissue Transfer Text: The defect edges were debeveled with a #15 scalpel blade.  Given the location of the defect and the proximity to free margins an adjacent tissue transfer was deemed most appropriate.  Using a sterile surgical marker, an appropriate flap was drawn incorporating the defect and placing the expected incisions within the relaxed skin tension lines where possible.    The area thus outlined was incised deep to adipose tissue with a #15 scalpel blade.  The skin margins were undermined to an appropriate distance in all directions utilizing iris scissors.
Advancement Flap (Single) Text: The defect edges were debeveled with a #15 scalpel blade.  Given the location of the defect and the proximity to free margins a single advancement flap was deemed most appropriate.  Using a sterile surgical marker, an appropriate advancement flap was drawn incorporating the defect and placing the expected incisions within the relaxed skin tension lines where possible.    The area thus outlined was incised deep to adipose tissue with a #15 scalpel blade.  The skin margins were undermined to an appropriate distance in all directions utilizing iris scissors.
Advancement Flap (Double) Text: The defect edges were debeveled with a #15 scalpel blade.  Given the location of the defect and the proximity to free margins a double advancement flap was deemed most appropriate.  Using a sterile surgical marker, the appropriate advancement flaps were drawn incorporating the defect and placing the expected incisions within the relaxed skin tension lines where possible.    The area thus outlined was incised deep to adipose tissue with a #15 scalpel blade.  The skin margins were undermined to an appropriate distance in all directions utilizing iris scissors.
Burow's Advancement Flap Text: The defect edges were debeveled with a #15 scalpel blade.  Given the location of the defect and the proximity to free margins a Burow's advancement flap was deemed most appropriate.  Using a sterile surgical marker, the appropriate advancement flap was drawn incorporating the defect and placing the expected incisions within the relaxed skin tension lines where possible.    The area thus outlined was incised deep to adipose tissue with a #15 scalpel blade.  The skin margins were undermined to an appropriate distance in all directions utilizing iris scissors.
Chonodrocutaneous Helical Advancement Flap Text: The defect edges were debeveled with a #15 scalpel blade.  Given the location of the defect and the proximity to free margins a chondrocutaneous helical advancement flap was deemed most appropriate.  Using a sterile surgical marker, the appropriate advancement flap was drawn incorporating the defect and placing the expected incisions within the relaxed skin tension lines where possible.    The area thus outlined was incised deep to adipose tissue with a #15 scalpel blade.  The skin margins were undermined to an appropriate distance in all directions utilizing iris scissors.
Crescentic Advancement Flap Text: The defect edges were debeveled with a #15 scalpel blade.  Given the location of the defect and the proximity to free margins a crescentic advancement flap was deemed most appropriate.  Using a sterile surgical marker, the appropriate advancement flap was drawn incorporating the defect and placing the expected incisions within the relaxed skin tension lines where possible.    The area thus outlined was incised deep to adipose tissue with a #15 scalpel blade.  The skin margins were undermined to an appropriate distance in all directions utilizing iris scissors.
A-T Advancement Flap Text: The defect edges were debeveled with a #15 scalpel blade.  Given the location of the defect, shape of the defect and the proximity to free margins an A-T advancement flap was deemed most appropriate.  Using a sterile surgical marker, an appropriate advancement flap was drawn incorporating the defect and placing the expected incisions within the relaxed skin tension lines where possible.    The area thus outlined was incised deep to adipose tissue with a #15 scalpel blade.  The skin margins were undermined to an appropriate distance in all directions utilizing iris scissors.
O-T Advancement Flap Text: The defect edges were debeveled with a #15 scalpel blade.  Given the location of the defect, shape of the defect and the proximity to free margins an O-T advancement flap was deemed most appropriate.  Using a sterile surgical marker, an appropriate advancement flap was drawn incorporating the defect and placing the expected incisions within the relaxed skin tension lines where possible.    The area thus outlined was incised deep to adipose tissue with a #15 scalpel blade.  The skin margins were undermined to an appropriate distance in all directions utilizing iris scissors.
O-L Flap Text: The defect edges were debeveled with a #15 scalpel blade.  Given the location of the defect, shape of the defect and the proximity to free margins an O-L flap was deemed most appropriate.  Using a sterile surgical marker, an appropriate advancement flap was drawn incorporating the defect and placing the expected incisions within the relaxed skin tension lines where possible.    The area thus outlined was incised deep to adipose tissue with a #15 scalpel blade.  The skin margins were undermined to an appropriate distance in all directions utilizing iris scissors.
O-Z Flap Text: The defect edges were debeveled with a #15 scalpel blade.  Given the location of the defect, shape of the defect and the proximity to free margins an O-Z flap was deemed most appropriate.  Using a sterile surgical marker, an appropriate transposition flap was drawn incorporating the defect and placing the expected incisions within the relaxed skin tension lines where possible. The area thus outlined was incised deep to adipose tissue with a #15 scalpel blade.  The skin margins were undermined to an appropriate distance in all directions utilizing iris scissors.
Double O-Z Flap Text: The defect edges were debeveled with a #15 scalpel blade.  Given the location of the defect, shape of the defect and the proximity to free margins a Double O-Z flap was deemed most appropriate.  Using a sterile surgical marker, an appropriate transposition flap was drawn incorporating the defect and placing the expected incisions within the relaxed skin tension lines where possible. The area thus outlined was incised deep to adipose tissue with a #15 scalpel blade.  The skin margins were undermined to an appropriate distance in all directions utilizing iris scissors.
V-Y Flap Text: The defect edges were debeveled with a #15 scalpel blade.  Given the location of the defect, shape of the defect and the proximity to free margins a V-Y flap was deemed most appropriate.  Using a sterile surgical marker, an appropriate advancement flap was drawn incorporating the defect and placing the expected incisions within the relaxed skin tension lines where possible.    The area thus outlined was incised deep to adipose tissue with a #15 scalpel blade.  The skin margins were undermined to an appropriate distance in all directions utilizing iris scissors.
Mercedes Flap Text: The defect edges were debeveled with a #15 scalpel blade.  Given the location of the defect, shape of the defect and the proximity to free margins a Mercedes flap was deemed most appropriate.  Using a sterile surgical marker, an appropriate advancement flap was drawn incorporating the defect and placing the expected incisions within the relaxed skin tension lines where possible. The area thus outlined was incised deep to adipose tissue with a #15 scalpel blade.  The skin margins were undermined to an appropriate distance in all directions utilizing iris scissors.
Modified Advancement Flap Text: The defect edges were debeveled with a #15 scalpel blade.  Given the location of the defect, shape of the defect and the proximity to free margins a modified advancement flap was deemed most appropriate.  Using a sterile surgical marker, an appropriate advancement flap was drawn incorporating the defect and placing the expected incisions within the relaxed skin tension lines where possible.    The area thus outlined was incised deep to adipose tissue with a #15 scalpel blade.  The skin margins were undermined to an appropriate distance in all directions utilizing iris scissors.
Mucosal Advancement Flap Text: Given the location of the defect, shape of the defect and the proximity to free margins a mucosal advancement flap was deemed most appropriate. Incisions were made with a 15 blade scalpel in the appropriate fashion along the cutaneous vermillion border and the mucosal lip. The remaining actinically damaged mucosal tissue was excised.  The mucosal advancement flap was then elevated to the gingival sulcus with care taken to preserve the neurovascular structures and advanced into the primary defect. Care was taken to ensure that precise realignment of the vermilion border was achieved.
Hatchet Flap Text: The defect edges were debeveled with a #15 scalpel blade.  Given the location of the defect, shape of the defect and the proximity to free margins a hatchet flap was deemed most appropriate.  Using a sterile surgical marker, an appropriate hatchet flap was drawn incorporating the defect and placing the expected incisions within the relaxed skin tension lines where possible.    The area thus outlined was incised deep to adipose tissue with a #15 scalpel blade.  The skin margins were undermined to an appropriate distance in all directions utilizing iris scissors.
Rotation Flap Text: The defect edges were debeveled with a #15 scalpel blade.  Given the location of the defect, shape of the defect and the proximity to free margins a rotation flap was deemed most appropriate.  Using a sterile surgical marker, an appropriate rotation flap was drawn incorporating the defect and placing the expected incisions within the relaxed skin tension lines where possible.    The area thus outlined was incised deep to adipose tissue with a #15 scalpel blade.  The skin margins were undermined to an appropriate distance in all directions utilizing iris scissors.
Spiral Flap Text: The defect edges were debeveled with a #15 scalpel blade.  Given the location of the defect, shape of the defect and the proximity to free margins a spiral flap was deemed most appropriate.  Using a sterile surgical marker, an appropriate rotation flap was drawn incorporating the defect and placing the expected incisions within the relaxed skin tension lines where possible. The area thus outlined was incised deep to adipose tissue with a #15 scalpel blade.  The skin margins were undermined to an appropriate distance in all directions utilizing iris scissors.
Staged Advancement Flap Text: The defect edges were debeveled with a #15 scalpel blade.  Given the location of the defect, shape of the defect and the proximity to free margins a staged advancement flap was deemed most appropriate.  Using a sterile surgical marker, an appropriate advancement flap was drawn incorporating the defect and placing the expected incisions within the relaxed skin tension lines where possible. The area thus outlined was incised deep to adipose tissue with a #15 scalpel blade.  The skin margins were undermined to an appropriate distance in all directions utilizing iris scissors.
Star Wedge Flap Text: The defect edges were debeveled with a #15 scalpel blade.  Given the location of the defect, shape of the defect and the proximity to free margins a star wedge flap was deemed most appropriate.  Using a sterile surgical marker, an appropriate rotation flap was drawn incorporating the defect and placing the expected incisions within the relaxed skin tension lines where possible. The area thus outlined was incised deep to adipose tissue with a #15 scalpel blade.  The skin margins were undermined to an appropriate distance in all directions utilizing iris scissors.
Transposition Flap Text: The defect edges were debeveled with a #15 scalpel blade.  Given the location of the defect and the proximity to free margins a transposition flap was deemed most appropriate.  Using a sterile surgical marker, an appropriate transposition flap was drawn incorporating the defect.    The area thus outlined was incised deep to adipose tissue with a #15 scalpel blade.  The skin margins were undermined to an appropriate distance in all directions utilizing iris scissors.
Muscle Hinge Flap Text: The defect edges were debeveled with a #15 scalpel blade.  Given the size, depth and location of the defect and the proximity to free margins a muscle hinge flap was deemed most appropriate.  Using a sterile surgical marker, an appropriate hinge flap was drawn incorporating the defect. The area thus outlined was incised with a #15 scalpel blade.  The skin margins were undermined to an appropriate distance in all directions utilizing iris scissors.
Mustarde Flap Text: The defect edges were debeveled with a #15 scalpel blade.  Given the size, depth and location of the defect and the proximity to free margins a Mustarde flap was deemed most appropriate.  Using a sterile surgical marker, an appropriate flap was drawn incorporating the defect. The area thus outlined was incised with a #15 scalpel blade.  The skin margins were undermined to an appropriate distance in all directions utilizing iris scissors.
Nasal Turnover Hinge Flap Text: The defect edges were debeveled with a #15 scalpel blade.  Given the size, depth, location of the defect and the defect being full thickness a nasal turnover hinge flap was deemed most appropriate.  Using a sterile surgical marker, an appropriate hinge flap was drawn incorporating the defect. The area thus outlined was incised with a #15 scalpel blade. The flap was designed to recreate the nasal mucosal lining and the alar rim. The skin margins were undermined to an appropriate distance in all directions utilizing iris scissors.
Nasalis-Muscle-Based Myocutaneous Island Pedicle Flap Text: Using a #15 blade, an incision was made around the donor flap to the level of the nasalis muscle. Wide lateral undermining was then performed in both the subcutaneous plane above the nasalis muscle, and in a submuscular plane just above periosteum. This allowed the formation of a free nasalis muscle axial pedicle (based on the angular artery) which was still attached to the actual cutaneous flap, increasing its mobility and vascular viability. Hemostasis was obtained with pinpoint electrocoagulation. The flap was mobilized into position and the pivotal anchor points positioned and stabilized with buried interrupted sutures. Subcutaneous and dermal tissues were closed in a multilayered fashion with sutures. Tissue redundancies were excised, and the epidermal edges were apposed without significant tension and sutured with sutures.
Orbicularis Oris Muscle Flap Text: The defect edges were debeveled with a #15 scalpel blade.  Given that the defect affected the competency of the oral sphincter an orbicularis oris muscle flap was deemed most appropriate to restore this competency and normal muscle function.  Using a sterile surgical marker, an appropriate flap was drawn incorporating the defect. The area thus outlined was incised with a #15 scalpel blade.
Melolabial Transposition Flap Text: The defect edges were debeveled with a #15 scalpel blade.  Given the location of the defect and the proximity to free margins a melolabial flap was deemed most appropriate.  Using a sterile surgical marker, an appropriate melolabial transposition flap was drawn incorporating the defect.    The area thus outlined was incised deep to adipose tissue with a #15 scalpel blade.  The skin margins were undermined to an appropriate distance in all directions utilizing iris scissors.
Rhombic Flap Text: The defect edges were debeveled with a #15 scalpel blade.  Given the location of the defect and the proximity to free margins a rhombic flap was deemed most appropriate.  Using a sterile surgical marker, an appropriate rhombic flap was drawn incorporating the defect.    The area thus outlined was incised deep to adipose tissue with a #15 scalpel blade.  The skin margins were undermined to an appropriate distance in all directions utilizing iris scissors.
Rhomboid Transposition Flap Text: The defect edges were debeveled with a #15 scalpel blade.  Given the location of the defect and the proximity to free margins a rhomboid transposition flap was deemed most appropriate.  Using a sterile surgical marker, an appropriate rhomboid flap was drawn incorporating the defect.    The area thus outlined was incised deep to adipose tissue with a #15 scalpel blade.  The skin margins were undermined to an appropriate distance in all directions utilizing iris scissors.
Bi-Rhombic Flap Text: The defect edges were debeveled with a #15 scalpel blade.  Given the location of the defect and the proximity to free margins a bi-rhombic flap was deemed most appropriate.  Using a sterile surgical marker, an appropriate rhombic flap was drawn incorporating the defect. The area thus outlined was incised deep to adipose tissue with a #15 scalpel blade.  The skin margins were undermined to an appropriate distance in all directions utilizing iris scissors.
Helical Rim Advancement Flap Text: The defect edges were debeveled with a #15 blade scalpel.  Given the location of the defect and the proximity to free margins (helical rim) a double helical rim advancement flap was deemed most appropriate.  Using a sterile surgical marker, the appropriate advancement flaps were drawn incorporating the defect and placing the expected incisions between the helical rim and antihelix where possible.  The area thus outlined was incised through and through with a #15 scalpel blade.  With a skin hook and iris scissors, the flaps were gently and sharply undermined and freed up.
Bilateral Helical Rim Advancement Flap Text: The defect edges were debeveled with a #15 blade scalpel.  Given the location of the defect and the proximity to free margins (helical rim) a bilateral helical rim advancement flap was deemed most appropriate.  Using a sterile surgical marker, the appropriate advancement flaps were drawn incorporating the defect and placing the expected incisions between the helical rim and antihelix where possible.  The area thus outlined was incised through and through with a #15 scalpel blade.  With a skin hook and iris scissors, the flaps were gently and sharply undermined and freed up.
Ear Star Wedge Flap Text: The defect edges were debeveled with a #15 blade scalpel.  Given the location of the defect and the proximity to free margins (helical rim) an ear star wedge flap was deemed most appropriate.  Using a sterile surgical marker, the appropriate flap was drawn incorporating the defect and placing the expected incisions between the helical rim and antihelix where possible.  The area thus outlined was incised through and through with a #15 scalpel blade.
Banner Transposition Flap Text: The defect edges were debeveled with a #15 scalpel blade.  Given the location of the defect and the proximity to free margins a Banner transposition flap was deemed most appropriate.  Using a sterile surgical marker, an appropriate flap drawn around the defect. The area thus outlined was incised deep to adipose tissue with a #15 scalpel blade.  The skin margins were undermined to an appropriate distance in all directions utilizing iris scissors.
Bilobed Flap Text: The defect edges were debeveled with a #15 scalpel blade.  Given the location of the defect and the proximity to free margins a bilobe flap was deemed most appropriate.  Using a sterile surgical marker, an appropriate bilobe flap drawn around the defect.    The area thus outlined was incised deep to adipose tissue with a #15 scalpel blade.  The skin margins were undermined to an appropriate distance in all directions utilizing iris scissors.
Bilobed Transposition Flap Text: The defect edges were debeveled with a #15 scalpel blade.  Given the location of the defect and the proximity to free margins a bilobed transposition flap was deemed most appropriate.  Using a sterile surgical marker, an appropriate bilobe flap drawn around the defect.    The area thus outlined was incised deep to adipose tissue with a #15 scalpel blade.  The skin margins were undermined to an appropriate distance in all directions utilizing iris scissors.
Trilobed Flap Text: The defect edges were debeveled with a #15 scalpel blade.  Given the location of the defect and the proximity to free margins a trilobed flap was deemed most appropriate.  Using a sterile surgical marker, an appropriate trilobed flap drawn around the defect.    The area thus outlined was incised deep to adipose tissue with a #15 scalpel blade.  The skin margins were undermined to an appropriate distance in all directions utilizing iris scissors.
Dorsal Nasal Flap Text: The defect edges were debeveled with a #15 scalpel blade.  Given the location of the defect and the proximity to free margins a dorsal nasal flap was deemed most appropriate.  Using a sterile surgical marker, an appropriate dorsal nasal flap was drawn around the defect.    The area thus outlined was incised deep to adipose tissue with a #15 scalpel blade.  The skin margins were undermined to an appropriate distance in all directions utilizing iris scissors.
Island Pedicle Flap Text: The defect edges were debeveled with a #15 scalpel blade.  Given the location of the defect, shape of the defect and the proximity to free margins an island pedicle advancement flap was deemed most appropriate.  Using a sterile surgical marker, an appropriate advancement flap was drawn incorporating the defect, outlining the appropriate donor tissue and placing the expected incisions within the relaxed skin tension lines where possible.    The area thus outlined was incised deep to adipose tissue with a #15 scalpel blade.  The skin margins were undermined to an appropriate distance in all directions around the primary defect and laterally outward around the island pedicle utilizing iris scissors.  There was minimal undermining beneath the pedicle flap.
Island Pedicle Flap With Canthal Suspension Text: The defect edges were debeveled with a #15 scalpel blade.  Given the location of the defect, shape of the defect and the proximity to free margins an island pedicle advancement flap was deemed most appropriate.  Using a sterile surgical marker, an appropriate advancement flap was drawn incorporating the defect, outlining the appropriate donor tissue and placing the expected incisions within the relaxed skin tension lines where possible. The area thus outlined was incised deep to adipose tissue with a #15 scalpel blade.  The skin margins were undermined to an appropriate distance in all directions around the primary defect and laterally outward around the island pedicle utilizing iris scissors.  There was minimal undermining beneath the pedicle flap. A suspension suture was placed in the canthal tendon to prevent tension and prevent ectropion.
Alar Island Pedicle Flap Text: The defect edges were debeveled with a #15 scalpel blade.  Given the location of the defect, shape of the defect and the proximity to the alar rim an island pedicle advancement flap was deemed most appropriate.  Using a sterile surgical marker, an appropriate advancement flap was drawn incorporating the defect, outlining the appropriate donor tissue and placing the expected incisions within the nasal ala running parallel to the alar rim. The area thus outlined was incised with a #15 scalpel blade.  The skin margins were undermined minimally to an appropriate distance in all directions around the primary defect and laterally outward around the island pedicle utilizing iris scissors.  There was minimal undermining beneath the pedicle flap.
Double Island Pedicle Flap Text: The defect edges were debeveled with a #15 scalpel blade.  Given the location of the defect, shape of the defect and the proximity to free margins a double island pedicle advancement flap was deemed most appropriate.  Using a sterile surgical marker, an appropriate advancement flap was drawn incorporating the defect, outlining the appropriate donor tissue and placing the expected incisions within the relaxed skin tension lines where possible.    The area thus outlined was incised deep to adipose tissue with a #15 scalpel blade.  The skin margins were undermined to an appropriate distance in all directions around the primary defect and laterally outward around the island pedicle utilizing iris scissors.  There was minimal undermining beneath the pedicle flap.
Island Pedicle Flap-Requiring Vessel Identification Text: The defect edges were debeveled with a #15 scalpel blade.  Given the location of the defect, shape of the defect and the proximity to free margins an island pedicle advancement flap was deemed most appropriate.  Using a sterile surgical marker, an appropriate advancement flap was drawn, based on the axial vessel mentioned above, incorporating the defect, outlining the appropriate donor tissue and placing the expected incisions within the relaxed skin tension lines where possible.    The area thus outlined was incised deep to adipose tissue with a #15 scalpel blade.  The skin margins were undermined to an appropriate distance in all directions around the primary defect and laterally outward around the island pedicle utilizing iris scissors.  There was minimal undermining beneath the pedicle flap.
Keystone Flap Text: The defect edges were debeveled with a #15 scalpel blade.  Given the location of the defect, shape of the defect a keystone flap was deemed most appropriate.  Using a sterile surgical marker, an appropriate keystone flap was drawn incorporating the defect, outlining the appropriate donor tissue and placing the expected incisions within the relaxed skin tension lines where possible. The area thus outlined was incised deep to adipose tissue with a #15 scalpel blade.  The skin margins were undermined to an appropriate distance in all directions around the primary defect and laterally outward around the flap utilizing iris scissors.
O-T Plasty Text: The defect edges were debeveled with a #15 scalpel blade.  Given the location of the defect, shape of the defect and the proximity to free margins an O-T plasty was deemed most appropriate.  Using a sterile surgical marker, an appropriate O-T plasty was drawn incorporating the defect and placing the expected incisions within the relaxed skin tension lines where possible.    The area thus outlined was incised deep to adipose tissue with a #15 scalpel blade.  The skin margins were undermined to an appropriate distance in all directions utilizing iris scissors.
O-Z Plasty Text: The defect edges were debeveled with a #15 scalpel blade.  Given the location of the defect, shape of the defect and the proximity to free margins an O-Z plasty (double transposition flap) was deemed most appropriate.  Using a sterile surgical marker, the appropriate transposition flaps were drawn incorporating the defect and placing the expected incisions within the relaxed skin tension lines where possible.    The area thus outlined was incised deep to adipose tissue with a #15 scalpel blade.  The skin margins were undermined to an appropriate distance in all directions utilizing iris scissors.  Hemostasis was achieved with electrocautery.  The flaps were then transposed into place, one clockwise and the other counterclockwise, and anchored with interrupted buried subcutaneous sutures.
Double O-Z Plasty Text: The defect edges were debeveled with a #15 scalpel blade.  Given the location of the defect, shape of the defect and the proximity to free margins a Double O-Z plasty (double transposition flap) was deemed most appropriate.  Using a sterile surgical marker, the appropriate transposition flaps were drawn incorporating the defect and placing the expected incisions within the relaxed skin tension lines where possible. The area thus outlined was incised deep to adipose tissue with a #15 scalpel blade.  The skin margins were undermined to an appropriate distance in all directions utilizing iris scissors.  Hemostasis was achieved with electrocautery.  The flaps were then transposed into place, one clockwise and the other counterclockwise, and anchored with interrupted buried subcutaneous sutures.
V-Y Plasty Text: The defect edges were debeveled with a #15 scalpel blade.  Given the location of the defect, shape of the defect and the proximity to free margins an V-Y advancement flap was deemed most appropriate.  Using a sterile surgical marker, an appropriate advancement flap was drawn incorporating the defect and placing the expected incisions within the relaxed skin tension lines where possible.    The area thus outlined was incised deep to adipose tissue with a #15 scalpel blade.  The skin margins were undermined to an appropriate distance in all directions utilizing iris scissors.
H Plasty Text: Given the location of the defect, shape of the defect and the proximity to free margins a H-plasty was deemed most appropriate for repair.  Using a sterile surgical marker, the appropriate advancement arms of the H-plasty were drawn incorporating the defect and placing the expected incisions within the relaxed skin tension lines where possible. The area thus outlined was incised deep to adipose tissue with a #15 scalpel blade. The skin margins were undermined to an appropriate distance in all directions utilizing iris scissors.  The opposing advancement arms were then advanced into place in opposite direction and anchored with interrupted buried subcutaneous sutures.
W Plasty Text: The lesion was extirpated to the level of the fat with a #15 scalpel blade.  Given the location of the defect, shape of the defect and the proximity to free margins a W-plasty was deemed most appropriate for repair.  Using a sterile surgical marker, the appropriate transposition arms of the W-plasty were drawn incorporating the defect and placing the expected incisions within the relaxed skin tension lines where possible.    The area thus outlined was incised deep to adipose tissue with a #15 scalpel blade.  The skin margins were undermined to an appropriate distance in all directions utilizing iris scissors.  The opposing transposition arms were then transposed into place in opposite direction and anchored with interrupted buried subcutaneous sutures.
Z Plasty Text: The lesion was extirpated to the level of the fat with a #15 scalpel blade.  Given the location of the defect, shape of the defect and the proximity to free margins a Z-plasty was deemed most appropriate for repair.  Using a sterile surgical marker, the appropriate transposition arms of the Z-plasty were drawn incorporating the defect and placing the expected incisions within the relaxed skin tension lines where possible.    The area thus outlined was incised deep to adipose tissue with a #15 scalpel blade.  The skin margins were undermined to an appropriate distance in all directions utilizing iris scissors.  The opposing transposition arms were then transposed into place in opposite direction and anchored with interrupted buried subcutaneous sutures.
Zygomaticofacial Flap Text: Given the location of the defect, shape of the defect and the proximity to free margins a zygomaticofacial flap was deemed most appropriate for repair.  Using a sterile surgical marker, the appropriate flap was drawn incorporating the defect and placing the expected incisions within the relaxed skin tension lines where possible. The area thus outlined was incised deep to adipose tissue with a #15 scalpel blade with preservation of a vascular pedicle.  The skin margins were undermined to an appropriate distance in all directions utilizing iris scissors.  The flap was then placed into the defect and anchored with interrupted buried subcutaneous sutures.
Cheek Interpolation Flap Text: A decision was made to reconstruct the defect utilizing an interpolation axial flap and a staged reconstruction.  A telfa template was made of the defect.  This telfa template was then used to outline the Cheek Interpolation flap.  The donor area for the pedicle flap was then injected with anesthesia.  The flap was excised through the skin and subcutaneous tissue down to the layer of the underlying musculature.  The interpolation flap was carefully excised within this deep plane to maintain its blood supply.  The edges of the donor site were undermined.   The donor site was closed in a primary fashion.  The pedicle was then rotated into position and sutured.  Once the tube was sutured into place, adequate blood supply was confirmed with blanching and refill.  The pedicle was then wrapped with xeroform gauze and dressed appropriately with a telfa and gauze bandage to ensure continued blood supply and protect the attached pedicle.
Cheek-To-Nose Interpolation Flap Text: A decision was made to reconstruct the defect utilizing an interpolation axial flap and a staged reconstruction.  A telfa template was made of the defect.  This telfa template was then used to outline the Cheek-To-Nose Interpolation flap.  The donor area for the pedicle flap was then injected with anesthesia.  The flap was excised through the skin and subcutaneous tissue down to the layer of the underlying musculature.  The interpolation flap was carefully excised within this deep plane to maintain its blood supply.  The edges of the donor site were undermined.   The donor site was closed in a primary fashion.  The pedicle was then rotated into position and sutured.  Once the tube was sutured into place, adequate blood supply was confirmed with blanching and refill.  The pedicle was then wrapped with xeroform gauze and dressed appropriately with a telfa and gauze bandage to ensure continued blood supply and protect the attached pedicle.
Interpolation Flap Text: A decision was made to reconstruct the defect utilizing an interpolation axial flap and a staged reconstruction.  A telfa template was made of the defect.  This telfa template was then used to outline the interpolation flap.  The donor area for the pedicle flap was then injected with anesthesia.  The flap was excised through the skin and subcutaneous tissue down to the layer of the underlying musculature.  The interpolation flap was carefully excised within this deep plane to maintain its blood supply.  The edges of the donor site were undermined.   The donor site was closed in a primary fashion.  The pedicle was then rotated into position and sutured.  Once the tube was sutured into place, adequate blood supply was confirmed with blanching and refill.  The pedicle was then wrapped with xeroform gauze and dressed appropriately with a telfa and gauze bandage to ensure continued blood supply and protect the attached pedicle.
Melolabial Interpolation Flap Text: A decision was made to reconstruct the defect utilizing an interpolation axial flap and a staged reconstruction.  A telfa template was made of the defect.  This telfa template was then used to outline the melolabial interpolation flap.  The donor area for the pedicle flap was then injected with anesthesia.  The flap was excised through the skin and subcutaneous tissue down to the layer of the underlying musculature.  The pedicle flap was carefully excised within this deep plane to maintain its blood supply.  The edges of the donor site were undermined.   The donor site was closed in a primary fashion.  The pedicle was then rotated into position and sutured.  Once the tube was sutured into place, adequate blood supply was confirmed with blanching and refill.  The pedicle was then wrapped with xeroform gauze and dressed appropriately with a telfa and gauze bandage to ensure continued blood supply and protect the attached pedicle.
Mastoid Interpolation Flap Text: A decision was made to reconstruct the defect utilizing an interpolation axial flap and a staged reconstruction.  A telfa template was made of the defect.  This telfa template was then used to outline the mastoid interpolation flap.  The donor area for the pedicle flap was then injected with anesthesia.  The flap was excised through the skin and subcutaneous tissue down to the layer of the underlying musculature.  The pedicle flap was carefully excised within this deep plane to maintain its blood supply.  The edges of the donor site were undermined.   The donor site was closed in a primary fashion.  The pedicle was then rotated into position and sutured.  Once the tube was sutured into place, adequate blood supply was confirmed with blanching and refill.  The pedicle was then wrapped with xeroform gauze and dressed appropriately with a telfa and gauze bandage to ensure continued blood supply and protect the attached pedicle.
Posterior Auricular Interpolation Flap Text: A decision was made to reconstruct the defect utilizing an interpolation axial flap and a staged reconstruction.  A telfa template was made of the defect.  This telfa template was then used to outline the posterior auricular interpolation flap.  The donor area for the pedicle flap was then injected with anesthesia.  The flap was excised through the skin and subcutaneous tissue down to the layer of the underlying musculature.  The pedicle flap was carefully excised within this deep plane to maintain its blood supply.  The edges of the donor site were undermined.   The donor site was closed in a primary fashion.  The pedicle was then rotated into position and sutured.  Once the tube was sutured into place, adequate blood supply was confirmed with blanching and refill.  The pedicle was then wrapped with xeroform gauze and dressed appropriately with a telfa and gauze bandage to ensure continued blood supply and protect the attached pedicle.
Paramedian Forehead Flap Text: A decision was made to reconstruct the defect utilizing an interpolation axial flap and a staged reconstruction.  A telfa template was made of the defect.  This telfa template was then used to outline the paramedian forehead pedicle flap.  The donor area for the pedicle flap was then injected with anesthesia.  The flap was excised through the skin and subcutaneous tissue down to the layer of the underlying musculature.  The pedicle flap was carefully excised within this deep plane to maintain its blood supply.  The edges of the donor site were undermined.   The donor site was closed in a primary fashion.  The pedicle was then rotated into position and sutured.  Once the tube was sutured into place, adequate blood supply was confirmed with blanching and refill.  The pedicle was then wrapped with xeroform gauze and dressed appropriately with a telfa and gauze bandage to ensure continued blood supply and protect the attached pedicle.
Lip Wedge Excision Repair Text: Given the location of the defect and the proximity to free margins a full thickness wedge repair was deemed most appropriate.  Using a sterile surgical marker, the appropriate repair was drawn incorporating the defect and placing the expected incisions perpendicular to the vermilion border.  The vermilion border was also meticulously outlined to ensure appropriate reapproximation during the repair.  The area thus outlined was incised through and through with a #15 scalpel blade.  The muscularis and dermis were reaproximated with deep sutures following hemostasis. Care was taken to realign the vermilion border before proceeding with the superficial closure.  Once the vermilion was realigned the superfical and mucosal closure was finished.
Ftsg Text: The defect edges were debeveled with a #15 scalpel blade.  Given the location of the defect, shape of the defect and the proximity to free margins a full thickness skin graft was deemed most appropriate.  Using a sterile surgical marker, the primary defect shape was transferred to the donor site. The area thus outlined was incised deep to adipose tissue with a #15 scalpel blade.  The harvested graft was then trimmed of adipose tissue until only dermis and epidermis was left.  The skin margins of the secondary defect were undermined to an appropriate distance in all directions utilizing iris scissors.  The secondary defect was closed with interrupted buried subcutaneous sutures.  The skin edges were then re-apposed with running  sutures.  The skin graft was then placed in the primary defect and oriented appropriately.
Split-Thickness Skin Graft Text: The defect edges were debeveled with a #15 scalpel blade.  Given the location of the defect, shape of the defect and the proximity to free margins a split thickness skin graft was deemed most appropriate.  Using a sterile surgical marker, the primary defect shape was transferred to the donor site. The split thickness graft was then harvested.  The skin graft was then placed in the primary defect and oriented appropriately.
Burow's Graft Text: The defect edges were debeveled with a #15 scalpel blade.  Given the location of the defect, shape of the defect, the proximity to free margins and the presence of a standing cone deformity a Burow's skin graft was deemed most appropriate. The standing cone was removed and this tissue was then trimmed to the shape of the primary defect. The adipose tissue was also removed until only dermis and epidermis were left.  The skin margins of the secondary defect were undermined to an appropriate distance in all directions utilizing iris scissors.  The secondary defect was closed with interrupted buried subcutaneous sutures.  The skin edges were then re-apposed with running  sutures.  The skin graft was then placed in the primary defect and oriented appropriately.
Cartilage Graft Text: The defect edges were debeveled with a #15 scalpel blade.  Given the location of the defect, shape of the defect, the fact the defect involved a full thickness cartilage defect a cartilage graft was deemed most appropriate.  An appropriate donor site was identified, cleansed, and anesthetized. The cartilage graft was then harvested and transferred to the recipient site, oriented appropriately and then sutured into place.  The secondary defect was then repaired using a primary closure.
Composite Graft Text: The defect edges were debeveled with a #15 scalpel blade.  Given the location of the defect, shape of the defect, the proximity to free margins and the fact the defect was full thickness a composite graft was deemed most appropriate.  The defect was outline and then transferred to the donor site.  A full thickness graft was then excised from the donor site. The graft was then placed in the primary defect, oriented appropriately and then sutured into place.  The secondary defect was then repaired using a primary closure.
Epidermal Autograft Text: The defect edges were debeveled with a #15 scalpel blade.  Given the location of the defect, shape of the defect and the proximity to free margins an epidermal autograft was deemed most appropriate.  Using a sterile surgical marker, the primary defect shape was transferred to the donor site. The epidermal graft was then harvested.  The skin graft was then placed in the primary defect and oriented appropriately.
Dermal Autograft Text: The defect edges were debeveled with a #15 scalpel blade.  Given the location of the defect, shape of the defect and the proximity to free margins a dermal autograft was deemed most appropriate.  Using a sterile surgical marker, the primary defect shape was transferred to the donor site. The area thus outlined was incised deep to adipose tissue with a #15 scalpel blade.  The harvested graft was then trimmed of adipose and epidermal tissue until only dermis was left.  The skin graft was then placed in the primary defect and oriented appropriately.
Skin Substitute Text: The defect edges were debeveled with a #15 scalpel blade.  Given the location of the defect, shape of the defect and the proximity to free margins a skin substitute graft was deemed most appropriate.  The graft material was trimmed to fit the size of the defect. The graft was then placed in the primary defect and oriented appropriately.
Tissue Cultured Epidermal Autograft Text: The defect edges were debeveled with a #15 scalpel blade.  Given the location of the defect, shape of the defect and the proximity to free margins a tissue cultured epidermal autograft was deemed most appropriate.  The graft was then trimmed to fit the size of the defect.  The graft was then placed in the primary defect and oriented appropriately.
Xenograft Text: The defect edges were debeveled with a #15 scalpel blade.  Given the location of the defect, shape of the defect and the proximity to free margins a xenograft was deemed most appropriate.  The graft was then trimmed to fit the size of the defect.  The graft was then placed in the primary defect and oriented appropriately.
Purse String (Intermediate) Text: Given the location of the defect and the characteristics of the surrounding skin a purse string intermediate closure was deemed most appropriate.  Undermining was performed circumferentially around the surgical defect.  A purse string suture was then placed and tightened.
Purse String (Simple) Text: Given the location of the defect and the characteristics of the surrounding skin a purse string simple closure was deemed most appropriate.  Undermining was performed circumferentially around the surgical defect.  A purse string suture was then placed and tightened.
Complex Repair And Single Advancement Flap Text: The defect edges were debeveled with a #15 scalpel blade.  The primary defect was closed partially with a complex linear closure.  Given the location of the remaining defect, shape of the defect and the proximity to free margins a single advancement flap was deemed most appropriate for complete closure of the defect.  Using a sterile surgical marker, an appropriate advancement flap was drawn incorporating the defect and placing the expected incisions within the relaxed skin tension lines where possible.    The area thus outlined was incised deep to adipose tissue with a #15 scalpel blade.  The skin margins were undermined to an appropriate distance in all directions utilizing iris scissors.
Complex Repair And Double Advancement Flap Text: The defect edges were debeveled with a #15 scalpel blade.  The primary defect was closed partially with a complex linear closure.  Given the location of the remaining defect, shape of the defect and the proximity to free margins a double advancement flap was deemed most appropriate for complete closure of the defect.  Using a sterile surgical marker, an appropriate advancement flap was drawn incorporating the defect and placing the expected incisions within the relaxed skin tension lines where possible.    The area thus outlined was incised deep to adipose tissue with a #15 scalpel blade.  The skin margins were undermined to an appropriate distance in all directions utilizing iris scissors.
Complex Repair And Modified Advancement Flap Text: The defect edges were debeveled with a #15 scalpel blade.  The primary defect was closed partially with a complex linear closure.  Given the location of the remaining defect, shape of the defect and the proximity to free margins a modified advancement flap was deemed most appropriate for complete closure of the defect.  Using a sterile surgical marker, an appropriate advancement flap was drawn incorporating the defect and placing the expected incisions within the relaxed skin tension lines where possible.    The area thus outlined was incised deep to adipose tissue with a #15 scalpel blade.  The skin margins were undermined to an appropriate distance in all directions utilizing iris scissors.
Complex Repair And A-T Advancement Flap Text: The defect edges were debeveled with a #15 scalpel blade.  The primary defect was closed partially with a complex linear closure.  Given the location of the remaining defect, shape of the defect and the proximity to free margins an A-T advancement flap was deemed most appropriate for complete closure of the defect.  Using a sterile surgical marker, an appropriate advancement flap was drawn incorporating the defect and placing the expected incisions within the relaxed skin tension lines where possible.    The area thus outlined was incised deep to adipose tissue with a #15 scalpel blade.  The skin margins were undermined to an appropriate distance in all directions utilizing iris scissors.
Complex Repair And O-T Advancement Flap Text: The defect edges were debeveled with a #15 scalpel blade.  The primary defect was closed partially with a complex linear closure.  Given the location of the remaining defect, shape of the defect and the proximity to free margins an O-T advancement flap was deemed most appropriate for complete closure of the defect.  Using a sterile surgical marker, an appropriate advancement flap was drawn incorporating the defect and placing the expected incisions within the relaxed skin tension lines where possible.    The area thus outlined was incised deep to adipose tissue with a #15 scalpel blade.  The skin margins were undermined to an appropriate distance in all directions utilizing iris scissors.
Complex Repair And O-L Flap Text: The defect edges were debeveled with a #15 scalpel blade.  The primary defect was closed partially with a complex linear closure.  Given the location of the remaining defect, shape of the defect and the proximity to free margins an O-L flap was deemed most appropriate for complete closure of the defect.  Using a sterile surgical marker, an appropriate flap was drawn incorporating the defect and placing the expected incisions within the relaxed skin tension lines where possible.    The area thus outlined was incised deep to adipose tissue with a #15 scalpel blade.  The skin margins were undermined to an appropriate distance in all directions utilizing iris scissors.
Complex Repair And Bilobe Flap Text: The defect edges were debeveled with a #15 scalpel blade.  The primary defect was closed partially with a complex linear closure.  Given the location of the remaining defect, shape of the defect and the proximity to free margins a bilobe flap was deemed most appropriate for complete closure of the defect.  Using a sterile surgical marker, an appropriate advancement flap was drawn incorporating the defect and placing the expected incisions within the relaxed skin tension lines where possible.    The area thus outlined was incised deep to adipose tissue with a #15 scalpel blade.  The skin margins were undermined to an appropriate distance in all directions utilizing iris scissors.
Complex Repair And Melolabial Flap Text: The defect edges were debeveled with a #15 scalpel blade.  The primary defect was closed partially with a complex linear closure.  Given the location of the remaining defect, shape of the defect and the proximity to free margins a melolabial flap was deemed most appropriate for complete closure of the defect.  Using a sterile surgical marker, an appropriate advancement flap was drawn incorporating the defect and placing the expected incisions within the relaxed skin tension lines where possible.    The area thus outlined was incised deep to adipose tissue with a #15 scalpel blade.  The skin margins were undermined to an appropriate distance in all directions utilizing iris scissors.
Complex Repair And Rotation Flap Text: The defect edges were debeveled with a #15 scalpel blade.  The primary defect was closed partially with a complex linear closure.  Given the location of the remaining defect, shape of the defect and the proximity to free margins a rotation flap was deemed most appropriate for complete closure of the defect.  Using a sterile surgical marker, an appropriate advancement flap was drawn incorporating the defect and placing the expected incisions within the relaxed skin tension lines where possible.    The area thus outlined was incised deep to adipose tissue with a #15 scalpel blade.  The skin margins were undermined to an appropriate distance in all directions utilizing iris scissors.
Complex Repair And Rhombic Flap Text: The defect edges were debeveled with a #15 scalpel blade.  The primary defect was closed partially with a complex linear closure.  Given the location of the remaining defect, shape of the defect and the proximity to free margins a rhombic flap was deemed most appropriate for complete closure of the defect.  Using a sterile surgical marker, an appropriate advancement flap was drawn incorporating the defect and placing the expected incisions within the relaxed skin tension lines where possible.    The area thus outlined was incised deep to adipose tissue with a #15 scalpel blade.  The skin margins were undermined to an appropriate distance in all directions utilizing iris scissors.
Complex Repair And Transposition Flap Text: The defect edges were debeveled with a #15 scalpel blade.  The primary defect was closed partially with a complex linear closure.  Given the location of the remaining defect, shape of the defect and the proximity to free margins a transposition flap was deemed most appropriate for complete closure of the defect.  Using a sterile surgical marker, an appropriate advancement flap was drawn incorporating the defect and placing the expected incisions within the relaxed skin tension lines where possible.    The area thus outlined was incised deep to adipose tissue with a #15 scalpel blade.  The skin margins were undermined to an appropriate distance in all directions utilizing iris scissors.
Complex Repair And V-Y Plasty Text: The defect edges were debeveled with a #15 scalpel blade.  The primary defect was closed partially with a complex linear closure.  Given the location of the remaining defect, shape of the defect and the proximity to free margins a V-Y plasty was deemed most appropriate for complete closure of the defect.  Using a sterile surgical marker, an appropriate advancement flap was drawn incorporating the defect and placing the expected incisions within the relaxed skin tension lines where possible.    The area thus outlined was incised deep to adipose tissue with a #15 scalpel blade.  The skin margins were undermined to an appropriate distance in all directions utilizing iris scissors.
Complex Repair And M Plasty Text: The defect edges were debeveled with a #15 scalpel blade.  The primary defect was closed partially with a complex linear closure.  Given the location of the remaining defect, shape of the defect and the proximity to free margins an M plasty was deemed most appropriate for complete closure of the defect.  Using a sterile surgical marker, an appropriate advancement flap was drawn incorporating the defect and placing the expected incisions within the relaxed skin tension lines where possible.    The area thus outlined was incised deep to adipose tissue with a #15 scalpel blade.  The skin margins were undermined to an appropriate distance in all directions utilizing iris scissors.
Complex Repair And Double M Plasty Text: The defect edges were debeveled with a #15 scalpel blade.  The primary defect was closed partially with a complex linear closure.  Given the location of the remaining defect, shape of the defect and the proximity to free margins a double M plasty was deemed most appropriate for complete closure of the defect.  Using a sterile surgical marker, an appropriate advancement flap was drawn incorporating the defect and placing the expected incisions within the relaxed skin tension lines where possible.    The area thus outlined was incised deep to adipose tissue with a #15 scalpel blade.  The skin margins were undermined to an appropriate distance in all directions utilizing iris scissors.
Complex Repair And W Plasty Text: The defect edges were debeveled with a #15 scalpel blade.  The primary defect was closed partially with a complex linear closure.  Given the location of the remaining defect, shape of the defect and the proximity to free margins a W plasty was deemed most appropriate for complete closure of the defect.  Using a sterile surgical marker, an appropriate advancement flap was drawn incorporating the defect and placing the expected incisions within the relaxed skin tension lines where possible.    The area thus outlined was incised deep to adipose tissue with a #15 scalpel blade.  The skin margins were undermined to an appropriate distance in all directions utilizing iris scissors.
Complex Repair And Z Plasty Text: The defect edges were debeveled with a #15 scalpel blade.  The primary defect was closed partially with a complex linear closure.  Given the location of the remaining defect, shape of the defect and the proximity to free margins a Z plasty was deemed most appropriate for complete closure of the defect.  Using a sterile surgical marker, an appropriate advancement flap was drawn incorporating the defect and placing the expected incisions within the relaxed skin tension lines where possible.    The area thus outlined was incised deep to adipose tissue with a #15 scalpel blade.  The skin margins were undermined to an appropriate distance in all directions utilizing iris scissors.
Complex Repair And Dorsal Nasal Flap Text: The defect edges were debeveled with a #15 scalpel blade.  The primary defect was closed partially with a complex linear closure.  Given the location of the remaining defect, shape of the defect and the proximity to free margins a dorsal nasal flap was deemed most appropriate for complete closure of the defect.  Using a sterile surgical marker, an appropriate flap was drawn incorporating the defect and placing the expected incisions within the relaxed skin tension lines where possible.    The area thus outlined was incised deep to adipose tissue with a #15 scalpel blade.  The skin margins were undermined to an appropriate distance in all directions utilizing iris scissors.
Complex Repair And Ftsg Text: The defect edges were debeveled with a #15 scalpel blade.  The primary defect was closed partially with a complex linear closure.  Given the location of the defect, shape of the defect and the proximity to free margins a full thickness skin graft was deemed most appropriate to repair the remaining defect.  The graft was trimmed to fit the size of the remaining defect.  The graft was then placed in the primary defect, oriented appropriately, and sutured into place.
Complex Repair And Burow's Graft Text: The defect edges were debeveled with a #15 scalpel blade.  The primary defect was closed partially with a complex linear closure.  Given the location of the defect, shape of the defect, the proximity to free margins and the presence of a standing cone deformity a Burow's graft was deemed most appropriate to repair the remaining defect.  The graft was trimmed to fit the size of the remaining defect.  The graft was then placed in the primary defect, oriented appropriately, and sutured into place.
Complex Repair And Split-Thickness Skin Graft Text: The defect edges were debeveled with a #15 scalpel blade.  The primary defect was closed partially with a complex linear closure.  Given the location of the defect, shape of the defect and the proximity to free margins a split thickness skin graft was deemed most appropriate to repair the remaining defect.  The graft was trimmed to fit the size of the remaining defect.  The graft was then placed in the primary defect, oriented appropriately, and sutured into place.
Complex Repair And Epidermal Autograft Text: The defect edges were debeveled with a #15 scalpel blade.  The primary defect was closed partially with a complex linear closure.  Given the location of the defect, shape of the defect and the proximity to free margins an epidermal autograft was deemed most appropriate to repair the remaining defect.  The graft was trimmed to fit the size of the remaining defect.  The graft was then placed in the primary defect, oriented appropriately, and sutured into place.
Complex Repair And Dermal Autograft Text: The defect edges were debeveled with a #15 scalpel blade.  The primary defect was closed partially with a complex linear closure.  Given the location of the defect, shape of the defect and the proximity to free margins an dermal autograft was deemed most appropriate to repair the remaining defect.  The graft was trimmed to fit the size of the remaining defect.  The graft was then placed in the primary defect, oriented appropriately, and sutured into place.
Complex Repair And Tissue Cultured Epidermal Autograft Text: The defect edges were debeveled with a #15 scalpel blade.  The primary defect was closed partially with a complex linear closure.  Given the location of the defect, shape of the defect and the proximity to free margins an tissue cultured epidermal autograft was deemed most appropriate to repair the remaining defect.  The graft was trimmed to fit the size of the remaining defect.  The graft was then placed in the primary defect, oriented appropriately, and sutured into place.
Complex Repair And Xenograft Text: The defect edges were debeveled with a #15 scalpel blade.  The primary defect was closed partially with a complex linear closure.  Given the location of the defect, shape of the defect and the proximity to free margins a xenograft was deemed most appropriate to repair the remaining defect.  The graft was trimmed to fit the size of the remaining defect.  The graft was then placed in the primary defect, oriented appropriately, and sutured into place.
Complex Repair And Skin Substitute Graft Text: The defect edges were debeveled with a #15 scalpel blade.  The primary defect was closed partially with a complex linear closure.  Given the location of the remaining defect, shape of the defect and the proximity to free margins a skin substitute graft was deemed most appropriate to repair the remaining defect.  The graft was trimmed to fit the size of the remaining defect.  The graft was then placed in the primary defect, oriented appropriately, and sutured into place.
Consent was obtained from the patient. The risks and benefits to therapy were discussed in detail. Specifically, the risks of infection, scarring, bleeding, prolonged wound healing, incomplete removal, allergy to anesthesia, nerve injury and recurrence were addressed. Prior to the procedure, the treatment site was clearly identified and confirmed by the patient. All components of Universal Protocol/PAUSE Rule completed.
Post-Care Instructions: I reviewed with the patient in detail post-care instructions. Patient is not to engage in any heavy lifting, exercise, or swimming for the next 14 days. Should the patient develop any fevers, chills, bleeding, severe pain patient will contact the office immediately.
Home Suture Removal Text: Patient was provided a home suture removal kit and will remove their sutures at home.  If they have any questions or difficulties they will call the office.
Where Do You Want The Question To Include Opioid Counseling Located?: Case Summary Tab
Billing Type: Third-Party Bill
Information: Selecting Yes will display possible errors in your note based on the variables you have selected. This validation is only offered as a suggestion for you. PLEASE NOTE THAT THE VALIDATION TEXT WILL BE REMOVED WHEN YOU FINALIZE YOUR NOTE. IF YOU WANT TO FAX A PRELIMINARY NOTE YOU WILL NEED TO TOGGLE THIS TO 'NO' IF YOU DO NOT WANT IT IN YOUR FAXED NOTE.

## 2022-07-13 ENCOUNTER — APPOINTMENT (RX ONLY)
Dept: URBAN - METROPOLITAN AREA CLINIC 41 | Facility: CLINIC | Age: 62
Setting detail: DERMATOLOGY
End: 2022-07-13

## 2022-07-13 DIAGNOSIS — Z48.817 ENCOUNTER FOR SURGICAL AFTERCARE FOLLOWING SURGERY ON THE SKIN AND SUBCUTANEOUS TISSUE: ICD-10-CM

## 2022-07-13 DIAGNOSIS — Z41.9 ENCOUNTER FOR PROCEDURE FOR PURPOSES OTHER THAN REMEDYING HEALTH STATE, UNSPECIFIED: ICD-10-CM

## 2022-07-13 PROCEDURE — 99024 POSTOP FOLLOW-UP VISIT: CPT

## 2022-07-13 PROCEDURE — ? POST-OP WOUND CHECK

## 2022-07-13 PROCEDURE — ? HYDRAFACIAL

## 2022-07-13 ASSESSMENT — LOCATION DETAILED DESCRIPTION DERM: LOCATION DETAILED: RIGHT SUPERIOR UPPER BACK

## 2022-07-13 ASSESSMENT — LOCATION SIMPLE DESCRIPTION DERM: LOCATION SIMPLE: RIGHT UPPER BACK

## 2022-07-13 ASSESSMENT — LOCATION ZONE DERM: LOCATION ZONE: TRUNK

## 2022-07-13 NOTE — PROCEDURE: POST-OP WOUND CHECK
Detail Level: Detailed
Add 19820 Cpt? (Important Note: In 2017 The Use Of 22600 Is Being Tracked By Cms To Determine Future Global Period Reimbursement For Global Periods): yes
Wound Evaluated By: Celi Noonan PA-C

## 2022-07-13 NOTE — PROCEDURE: HYDRAFACIAL
Post-Care Instructions: I reviewed with the patient in detail post-care instructions. Patient should stay away from the sun and wear sun protection until treated areas are fully healed. Patient should avoid any exfoliating products and retinols for 1 week post procedure.
Location: face
Solution: Activ-4
Vacuum Pressure High Setting (Will Not Render If Set To 0): 0
Solution Override
Vacuum Pressure Low Setting (Will Not Render If Set To 0): 14
Solution Override: Beta Clear HD
Solution Override: Sente Dermal Repair
Solution: GlySal 15%
Tip: Hydropeel Tip, Teal
Solution Override: Antiox+
Number Of Passes: 1
Tip: Hydropeel Tip, Clear
Treatment Number: 2
Tip: Hydropeel Tip, Blue
Procedure: Extraction
Comments: Patient said she felt no tingling sensation with the peel. There was light excess heat and mid erythema in the skin. Manual Blackhead extractions were performed using cotton tipped applicators. \\n\\nProducts Used: Proscriptix Antiox Cleanser, Rejuvenating Eye Cream, Triple Antioxidant Cream, Phyto Corrective Gel, Triple Lipid Restore, and RE Sheer Physical SPF 50.
Tip Override
Procedure: Boost
Procedure: Fusion
Indication: dehydrated skin
Procedure: Exfoliation
Vacuum Pressure Low Setting (Will Not Render If Set To 0): 20
Consent: Written consent obtained, risks reviewed including but not limited to crusting, scabbing, blistering, scarring, darker or lighter pigmentary change, bruising, and/or incomplete response.
Procedure: Peel

## 2022-07-22 ENCOUNTER — RX ONLY (OUTPATIENT)
Age: 62
Setting detail: RX ONLY
End: 2022-07-22

## 2022-07-22 RX ORDER — ADALIMUMAB 80MG/0.8ML
KIT SUBCUTANEOUS
Qty: 1 | Refills: 5 | Status: ERX | COMMUNITY
Start: 2022-07-22

## 2022-07-22 RX ORDER — ADALIMUMAB 80MG/0.8ML
KIT SUBCUTANEOUS
Qty: 3 | Refills: 0 | Status: ERX

## 2022-07-25 ENCOUNTER — APPOINTMENT (RX ONLY)
Dept: URBAN - METROPOLITAN AREA CLINIC 41 | Facility: CLINIC | Age: 62
Setting detail: DERMATOLOGY
End: 2022-07-25

## 2022-07-25 DIAGNOSIS — Z48.02 ENCOUNTER FOR REMOVAL OF SUTURES: ICD-10-CM

## 2022-07-25 PROCEDURE — ? SUTURE REMOVAL (NO GLOBAL PERIOD)

## 2022-07-25 ASSESSMENT — LOCATION ZONE DERM: LOCATION ZONE: ARM

## 2022-07-25 ASSESSMENT — LOCATION SIMPLE DESCRIPTION DERM: LOCATION SIMPLE: RIGHT SHOULDER

## 2022-07-25 ASSESSMENT — LOCATION DETAILED DESCRIPTION DERM: LOCATION DETAILED: RIGHT POSTERIOR SHOULDER

## 2022-07-26 ENCOUNTER — RX ONLY (OUTPATIENT)
Age: 62
Setting detail: RX ONLY
End: 2022-07-26

## 2022-07-26 RX ORDER — ADALIMUMAB 80MG/0.8ML
KIT SUBCUTANEOUS
Qty: 3 | Refills: 0 | Status: ERX

## 2022-07-26 RX ORDER — ADALIMUMAB 80MG/0.8ML
KIT SUBCUTANEOUS
Qty: 1 | Refills: 5 | Status: ERX

## 2022-09-08 ENCOUNTER — APPOINTMENT (RX ONLY)
Dept: URBAN - METROPOLITAN AREA CLINIC 41 | Facility: CLINIC | Age: 62
Setting detail: DERMATOLOGY
End: 2022-09-08

## 2022-09-08 DIAGNOSIS — Z41.9 ENCOUNTER FOR PROCEDURE FOR PURPOSES OTHER THAN REMEDYING HEALTH STATE, UNSPECIFIED: ICD-10-CM

## 2022-09-08 PROCEDURE — ? HYDRAFACIAL

## 2022-09-08 PROCEDURE — ? DERMAPLANE

## 2022-09-08 ASSESSMENT — LOCATION SIMPLE DESCRIPTION DERM: LOCATION SIMPLE: LEFT CHEEK

## 2022-09-08 ASSESSMENT — LOCATION ZONE DERM: LOCATION ZONE: FACE

## 2022-09-08 ASSESSMENT — LOCATION DETAILED DESCRIPTION DERM: LOCATION DETAILED: LEFT INFERIOR CENTRAL MALAR CHEEK

## 2022-09-08 NOTE — PROCEDURE: HYDRAFACIAL
Comments: Prior to treatment a dermaplane treatment was performed. The patient said she felt some stinging from the peel. There was mild erythema in the cheeks and little excess heat in the skin. One manual blackhead extraction was performed on the forehead using cotton tipped applicators. \\n\\nProducts Used: Skin[employer]uticals Simply Clean, Rejuvenating Eye Cream, Triple Antioxidant Cream, Triple Lipid Restore, and RE Sheer Physical SPF50.

## 2022-09-08 NOTE — PROCEDURE: DERMAPLANE
Pre-Procedure Text: Written consent was obtained prior to treatment. \\nThe patient was placed in a recumbent position on the procedure table.
Blade: 10R blade scalpel
Detail Level: Zone
Post-Care Instructions: I reviewed with the patient in detail post-care instructions.
Treatment Areas: face
Treatment Area Prep: isopropyl alcohol
Price (Use Numbers Only, No Special Characters Or $): 90
Comments: The skin was cleansed using [employer] Simply Clean Cleanser and degreased using isopropyl alcohol wipes. Once the dermaplane treatment was complete, a HydraFacial Deluxe treatment was performed.

## 2022-09-08 NOTE — PROCEDURE: HYDRAFACIAL
Price (Use Numbers Only, No Special Characters Or $): 422 Price (Use Numbers Only, No Special Characters Or $): 761

## 2022-10-20 ENCOUNTER — APPOINTMENT (RX ONLY)
Dept: URBAN - METROPOLITAN AREA CLINIC 41 | Facility: CLINIC | Age: 62
Setting detail: DERMATOLOGY
End: 2022-10-20

## 2022-10-20 DIAGNOSIS — Z41.9 ENCOUNTER FOR PROCEDURE FOR PURPOSES OTHER THAN REMEDYING HEALTH STATE, UNSPECIFIED: ICD-10-CM

## 2022-10-20 PROCEDURE — ? DERMAPLANE

## 2022-10-20 PROCEDURE — ? HYDRAFACIAL

## 2022-10-20 ASSESSMENT — LOCATION DETAILED DESCRIPTION DERM: LOCATION DETAILED: LEFT INFERIOR CENTRAL MALAR CHEEK

## 2022-10-20 ASSESSMENT — LOCATION ZONE DERM: LOCATION ZONE: FACE

## 2022-10-20 ASSESSMENT — LOCATION SIMPLE DESCRIPTION DERM: LOCATION SIMPLE: LEFT CHEEK

## 2022-10-20 NOTE — PROCEDURE: DERMAPLANE
Pre-Procedure Text: Written consent was obtained prior to treatment. \\nThe patient was placed in a recumbent position on the procedure table.
Post-Care Instructions: I reviewed with the patient in detail post-care instructions.
Price (Use Numbers Only, No Special Characters Or $): 90
Comments: The skin was cleansed using [employer] Simply Clean Cleanser and degreased using isopropyl alcohol wipes. Once the dermaplane treatment was complete, a HydraFacial Deluxe treatment was performed.
Treatment Areas: face
Detail Level: Zone
Blade: 10R blade scalpel

## 2022-10-20 NOTE — PROCEDURE: HYDRAFACIAL
Comments: Prior to treatment a dermaplane treatment was performed. The patient said she felt some light stinging from the peel on the left side of her forehead. There was mild erythema in the cheeks and little excess heat in the skin. Two manual blackhead extractions were performed using cotton tipped applicators.\\n\\nProducts Used: Skin[employer]uticals Simply Clean, Rejuvenating Eye Cream, Triple Antioxidant Cream, Obagi Hydrate Luxe, and RE Sheer Physical SPF50.

## 2022-10-20 NOTE — PROCEDURE: HYDRAFACIAL
Price (Use Numbers Only, No Special Characters Or $): 689 Price (Use Numbers Only, No Special Characters Or $): 968

## 2022-11-10 ENCOUNTER — APPOINTMENT (RX ONLY)
Dept: URBAN - METROPOLITAN AREA CLINIC 41 | Facility: CLINIC | Age: 62
Setting detail: DERMATOLOGY
End: 2022-11-10

## 2022-11-10 DIAGNOSIS — Z41.9 ENCOUNTER FOR PROCEDURE FOR PURPOSES OTHER THAN REMEDYING HEALTH STATE, UNSPECIFIED: ICD-10-CM

## 2022-11-10 PROCEDURE — ? ADDITIONAL NOTES

## 2022-11-10 PROCEDURE — ? GENTLEMAX

## 2022-11-10 ASSESSMENT — LOCATION SIMPLE DESCRIPTION DERM
LOCATION SIMPLE: SUBMENTAL CHIN
LOCATION SIMPLE: UPPER LIP

## 2022-11-10 ASSESSMENT — LOCATION ZONE DERM
LOCATION ZONE: LIP
LOCATION ZONE: FACE

## 2022-11-10 ASSESSMENT — LOCATION DETAILED DESCRIPTION DERM
LOCATION DETAILED: SUBMENTAL CHIN
LOCATION DETAILED: PHILTRUM

## 2022-11-10 NOTE — HPI: COSMETIC (LASER HAIR REMOVAL)
Additional History: EST pt presenting for laser hair removal on upper lip and chin. Second tx. Pt notes less hair.

## 2022-11-28 ENCOUNTER — APPOINTMENT (RX ONLY)
Dept: URBAN - METROPOLITAN AREA CLINIC 41 | Facility: CLINIC | Age: 62
Setting detail: DERMATOLOGY
End: 2022-11-28

## 2022-11-28 DIAGNOSIS — Z02.9 ENCOUNTER FOR ADMINISTRATIVE EXAMINATIONS, UNSPECIFIED: ICD-10-CM

## 2022-11-28 PROCEDURE — ? ADDITIONAL NOTES

## 2022-11-28 NOTE — PROCEDURE: ADDITIONAL NOTES
Render Risk Assessment In Note?: no
Additional Notes: Patient came in for a VI Peel Precision Plus. Patient has a possible cold sore on her lip. The patient and I decided to wait to do the treatment.
Detail Level: Simple

## 2022-12-08 ENCOUNTER — APPOINTMENT (RX ONLY)
Dept: URBAN - METROPOLITAN AREA CLINIC 41 | Facility: CLINIC | Age: 62
Setting detail: DERMATOLOGY
End: 2022-12-08

## 2022-12-08 DIAGNOSIS — Z41.9 ENCOUNTER FOR PROCEDURE FOR PURPOSES OTHER THAN REMEDYING HEALTH STATE, UNSPECIFIED: ICD-10-CM

## 2022-12-08 PROCEDURE — ? VI PEEL

## 2022-12-08 PROCEDURE — ? COSMETIC CONSULTATION: CHEMICAL PEELS

## 2022-12-08 NOTE — PROCEDURE: VI PEEL
Consent: Prior to the procedure, written consent was obtained and risks were reviewed, including but not limited to: redness, peeling, blistering, pigmentary change, scarring, infection, and pain. Patient is aware multiple treatments may be necessary to achieve the desired outcome.
Post Peel Care: After the procedure, the patient was instructed not to wash the treated area for 4 hours or manually remove dead skin when the peeling process starts. Patient may use OTC hydrocortisone cream for itching. Patient instructed to use the provided Retin-A wipes on the treated area on the 1st and 2nd nights.
Detail Level: Zone
Post-Care Instructions: I reviewed with the patient in detail post-care instructions. Patient should avoid sun exposure and wear sun protection. Pt should not do anything that will cause excess sweating, including but not limited to: working out, saunas, steam rooms, hot tubs, pools, hot showers, hot baths, etc. Pt should not pick at any peeling skin. I gave patient the post care products and instructed to only use those products for the next 7 days while skin is healing. I informed the patient that they can apply Vaseline/Aquaphor to eye area and if skin is extra dry. When drying the skin, patient should pat the skin dry, not rub.
Treatment Number: 1
Comments: 5 layers of the peel were applied. The patient said she felt a tingling/stinging sensation that was ranging from a 1-3 on a scale of 1-10 for intensity. The patient said she felt it the most in the middle of her cheeks and she described that as the 3 after the last layer. There was mild erythema in the skin, mainly in the cheeks, and little to no excess heat in the skin. No frosting occurred.
Prep: The treated area was cleansed with VI Derm Gentle Purifying Cleanser, degreased with an acetone wipe, and Vaseline was applied for protection of mucous membranes
Chemical Peel: VI Peel Precision Plus

## 2023-01-23 ENCOUNTER — APPOINTMENT (RX ONLY)
Dept: URBAN - METROPOLITAN AREA CLINIC 41 | Facility: CLINIC | Age: 63
Setting detail: DERMATOLOGY
End: 2023-01-23

## 2023-01-23 DIAGNOSIS — Z41.9 ENCOUNTER FOR PROCEDURE FOR PURPOSES OTHER THAN REMEDYING HEALTH STATE, UNSPECIFIED: ICD-10-CM

## 2023-01-23 PROCEDURE — ? HYDRAFACIAL

## 2023-01-23 NOTE — PROCEDURE: HYDRAFACIAL
Vacuum Pressure Low Setting (Will Not Render If Set To 0): 14
Number Of Passes: 0
Consent: Written consent obtained, risks reviewed including but not limited to crusting, scabbing, blistering, scarring, darker or lighter pigmentary change, bruising, and/or incomplete response.
Procedure: Boost
Location: face
Solution Override
Procedure: Fusion
Tip Override
Solution: Activ-4
Vacuum Pressure Low Setting (Will Not Render If Set To 0): 20
Number Of Passes: 1
Tip: Hydropeel Tip, Teal
Tip: Hydropeel Tip, Clear
Solution Override: Beta Clear HD
Procedure: Peel
Solution Override: Antiox+
Solution Override: Sente Dermal Repair
Tip: Hydropeel Tip, Blue
Comments: Patient said she didn't feel any tingling sensation with the peel. There was mild erythema and light excess heat in the skin. Manual extractions were performed using cotton tipped applicators and a sterile needle.\\n\\nProducts Used: Simply Clean Cleanser, Rejuvenating Eye Cream, Triple Antioxidant Cream,  Advanced Correction Cream, and RE Sheer Physical SPF 50.
Indication: dehydrated skin
Number Of Passes: 2
Post-Care Instructions: I reviewed with the patient in detail post-care instructions. Patient should stay away from the sun and wear sun protection until treated areas are fully healed. Patient should avoid any exfoliating products and retinols for 1 week post procedure.
Procedure: Extraction
Solution: GlySal 30%
Procedure: Exfoliation

## 2023-02-07 ENCOUNTER — APPOINTMENT (RX ONLY)
Dept: URBAN - METROPOLITAN AREA CLINIC 41 | Facility: CLINIC | Age: 63
Setting detail: DERMATOLOGY
End: 2023-02-07

## 2023-02-07 DIAGNOSIS — Z41.9 ENCOUNTER FOR PROCEDURE FOR PURPOSES OTHER THAN REMEDYING HEALTH STATE, UNSPECIFIED: ICD-10-CM

## 2023-02-07 PROCEDURE — ? VI PEEL

## 2023-02-07 NOTE — PROCEDURE: VI PEEL
Comments: 6 layers of the peel were applied. There was mild erythema in the cheeks and little erythema in the rest of the face. There was little to no excess heat in the skin. The patient said she felt a menthol like tingling that was a 3 on the cheeks and a 1 on the rest of the face for intensity. No frosting occurred.
Consent: Prior to the procedure, written consent was obtained and risks were reviewed, including but not limited to: redness, peeling, blistering, pigmentary change, scarring, infection, and pain. Patient is aware multiple treatments may be necessary to achieve the desired outcome.
Post-Care Instructions: I reviewed with the patient in detail post-care instructions. Patient should avoid sun exposure and wear sun protection. Pt should not do anything that will cause excess sweating, including but not limited to: working out, saunas, steam rooms, hot tubs, pools, hot showers, hot baths, etc. Pt should not pick at any peeling skin. I gave patient the post care products and instructed to only use those products for the next 14 days while skin is healing. I informed the patient that they can apply Vaseline/Aquaphor to eye area and if skin is extra dry. When drying the skin, patient should pat the skin dry, not rub.
Detail Level: Zone
Treatment Number: 2
Prep: The treated area was cleansed with VI Derm Gentle Purifying Cleanser and degreased with an acetone wipe. Then, Vaseline was applied for the protection of the mucous membranes.
Post Peel Care: After the procedure, the patient was instructed not to wash the treated area for 4 hours or manually remove dead skin when the peeling process starts. Patient may use OTC hydrocortisone cream for itching. Patient instructed to use the provided Retinol towelettes on the treated area until peeling begins.
Chemical Peel: VI Peel Precision Plus

## 2023-03-30 ENCOUNTER — APPOINTMENT (RX ONLY)
Dept: URBAN - METROPOLITAN AREA CLINIC 41 | Facility: CLINIC | Age: 63
Setting detail: DERMATOLOGY
End: 2023-03-30

## 2023-03-30 DIAGNOSIS — Z41.9 ENCOUNTER FOR PROCEDURE FOR PURPOSES OTHER THAN REMEDYING HEALTH STATE, UNSPECIFIED: ICD-10-CM

## 2023-03-30 PROCEDURE — ? VI PEEL

## 2023-03-30 NOTE — PROCEDURE: VI PEEL
Comments: 6 layers of the peel were applied. There was mild erythema in the cheeks, light erythema in the chin, and no erythema everywhere else. There was light excess heat in the chin and forehead and no excess heat everywhere else. The patient said she didn't feel anything after the first layer. On layers 2-5, patient said she felt a menthol like tingling around the outside of her face that was a 1/2 out of 10 for intensity. On the 6th layer the patient said she felt some tingling/burning around her mouth that was a 5 out of 10 for intensity. No frosting occurred.
Prep: The treated area was cleansed with VI Derm Gentle Purifying Cleanser and degreased with an acetone wipe. Then, Vaseline was applied for the protection of the mucous membranes.
Post-Care Instructions: I reviewed with the patient in detail post-care instructions. Patient should avoid sun exposure and wear sun protection. Pt should not do anything that will cause excess sweating, including but not limited to: working out, saunas, steam rooms, hot tubs, pools, hot showers, hot baths, etc. Pt should not pick at any peeling skin. I gave patient the post care products and instructed to only use those products for the next 14 days while skin is healing. I informed the patient that they can apply Vaseline/Aquaphor to eye area and if skin is extra dry. When drying the skin, patient should pat the skin dry, not rub.
Detail Level: Zone
Chemical Peel: VI Peel Precision Plus
Post Peel Care: After the procedure, the patient was instructed not to wash the treated area for 4 hours or manually remove dead skin when the peeling process starts. Patient may use OTC hydrocortisone cream for itching. Patient instructed to use the provided Retinol towelettes on the treated area until peeling begins.
Treatment Number: 3
Consent: Prior to the procedure, written consent was obtained and risks were reviewed, including but not limited to: redness, peeling, blistering, pigmentary change, scarring, infection, and pain. Patient is aware multiple treatments may be necessary to achieve the desired outcome.

## 2023-04-26 ENCOUNTER — APPOINTMENT (RX ONLY)
Dept: URBAN - METROPOLITAN AREA CLINIC 41 | Facility: CLINIC | Age: 63
Setting detail: DERMATOLOGY
End: 2023-04-26

## 2023-04-26 DIAGNOSIS — Z41.9 ENCOUNTER FOR PROCEDURE FOR PURPOSES OTHER THAN REMEDYING HEALTH STATE, UNSPECIFIED: ICD-10-CM

## 2023-04-26 PROCEDURE — ? HYDRAFACIAL

## 2023-04-26 PROCEDURE — ? DERMAPLANE

## 2023-04-26 ASSESSMENT — LOCATION SIMPLE DESCRIPTION DERM: LOCATION SIMPLE: LEFT CHEEK

## 2023-04-26 ASSESSMENT — LOCATION ZONE DERM: LOCATION ZONE: FACE

## 2023-04-26 ASSESSMENT — LOCATION DETAILED DESCRIPTION DERM: LOCATION DETAILED: LEFT INFERIOR CENTRAL MALAR CHEEK

## 2023-04-26 NOTE — PROCEDURE: HYDRAFACIAL
Procedure: Extend and Protect
Vacuum Pressure Low Setting (Will Not Render If Set To 0): 0
Comments: Patient said she felt some light burning. There was no excess heat and light to mild erythema in the skin. Manual extractions were performed using cotton tipped applicators and a comedone extractor. \\n\\nProducts Used: Simply Clean Cleanser, EyeMax AlphaRet, Triple Antioxidant Serum, Obagi Hydrate, and RE Sheer Physical SPF 50.
Number Of Passes: 2
Vacuum Pressure Low Setting (Will Not Render If Set To 0): 14
Solution: Activ-4
Solution Override: Sente Dermal Repair
Number Of Passes: 1
Solution Override
Tip: Hydropeel Tip, Teal
Tip Override
Procedure: Peel
Consent: Verbal consent obtained, risks reviewed including but not limited to crusting, scabbing, blistering, scarring, darker or lighter pigmentary change, bruising, and/or incomplete response.
Solution Override: Beta Clear HD
Post-Care Instructions: I reviewed with the patient in detail post-care instructions. Patient should stay away from the sun and wear sun protection until treated areas are fully healed. Patient should avoid any exfoliating products and retinols for 1 week post procedure.
Tip: Hydropeel Tip, Clear
Solution: GlySal 15%
Vacuum Pressure Low Setting (Will Not Render If Set To 0): 20
Procedure: Boost
Tip: Hydropeel Tip, Blue
Indication: prominent pores
Procedure: Exfoliation
Solution Override: Antiox+
Location: face
Procedure: Extraction

## 2023-04-26 NOTE — PROCEDURE: DERMAPLANE
Detail Level: Zone
Treatment Area Prep: isopropyl alcohol
Blade: 10R blade scalpel
Pre-Procedure Text: Written consent was obtained prior to treatment. \\nThe patient was placed in a recumbent position on the procedure table.
Treatment Areas: face
Comments: Skin was cleansed using Simply Clean Cleanser and degreased using isopropyl alcohol. Once dermaplane was complete, a HydraFacial Deluxe Treatment was performed.
Post-Care Instructions: I reviewed with the patient in detail post-care instructions.

## 2023-05-08 ENCOUNTER — APPOINTMENT (RX ONLY)
Dept: URBAN - METROPOLITAN AREA CLINIC 41 | Facility: CLINIC | Age: 63
Setting detail: DERMATOLOGY
End: 2023-05-08

## 2023-05-08 DIAGNOSIS — Z41.9 ENCOUNTER FOR PROCEDURE FOR PURPOSES OTHER THAN REMEDYING HEALTH STATE, UNSPECIFIED: ICD-10-CM

## 2023-05-08 PROCEDURE — ? COSMETIC CONSULTATION: FILLERS

## 2023-05-08 PROCEDURE — ? COSMETIC CONSULTATION: BOTULINUM TOXIN

## 2023-05-08 PROCEDURE — ? BOTOX

## 2023-05-08 ASSESSMENT — LOCATION SIMPLE DESCRIPTION DERM
LOCATION SIMPLE: RIGHT LIP
LOCATION SIMPLE: RIGHT UPPER LIP
LOCATION SIMPLE: SUPERIOR FOREHEAD
LOCATION SIMPLE: GLABELLA
LOCATION SIMPLE: LEFT LIP
LOCATION SIMPLE: LEFT CHEEK
LOCATION SIMPLE: RIGHT FOREHEAD
LOCATION SIMPLE: LEFT UPPER LIP
LOCATION SIMPLE: LEFT EYEBROW
LOCATION SIMPLE: RIGHT EYEBROW
LOCATION SIMPLE: LEFT FOREHEAD

## 2023-05-08 ASSESSMENT — LOCATION DETAILED DESCRIPTION DERM
LOCATION DETAILED: LEFT MEDIAL EYEBROW
LOCATION DETAILED: SUPERIOR MID FOREHEAD
LOCATION DETAILED: LEFT SUPERIOR VERMILION LIP
LOCATION DETAILED: LEFT MEDIAL FOREHEAD
LOCATION DETAILED: RIGHT SUPERIOR VERMILION BORDER
LOCATION DETAILED: RIGHT FOREHEAD
LOCATION DETAILED: LEFT CENTRAL BUCCAL CHEEK
LOCATION DETAILED: LEFT INFERIOR MEDIAL MALAR CHEEK
LOCATION DETAILED: RIGHT UPPER CUTANEOUS LIP
LOCATION DETAILED: LEFT FOREHEAD
LOCATION DETAILED: RIGHT MEDIAL FOREHEAD
LOCATION DETAILED: GLABELLA
LOCATION DETAILED: LEFT SUPERIOR VERMILION BORDER
LOCATION DETAILED: RIGHT CENTRAL EYEBROW
LOCATION DETAILED: RIGHT MEDIAL EYEBROW
LOCATION DETAILED: LEFT CENTRAL EYEBROW
LOCATION DETAILED: RIGHT SUPERIOR VERMILION LIP

## 2023-05-08 ASSESSMENT — LOCATION ZONE DERM
LOCATION ZONE: FACE
LOCATION ZONE: LIP

## 2023-05-08 NOTE — PROCEDURE: BOTOX
Price (Use Numbers Only, No Special Characters Or $): 536 Price (Use Numbers Only, No Special Characters Or $): 507

## 2023-05-22 ENCOUNTER — APPOINTMENT (RX ONLY)
Dept: URBAN - METROPOLITAN AREA CLINIC 41 | Facility: CLINIC | Age: 63
Setting detail: DERMATOLOGY
End: 2023-05-22

## 2023-05-22 DIAGNOSIS — Z41.9 ENCOUNTER FOR PROCEDURE FOR PURPOSES OTHER THAN REMEDYING HEALTH STATE, UNSPECIFIED: ICD-10-CM

## 2023-05-22 PROCEDURE — ? JUVEDERM VOLLURE XC INJECTION

## 2023-05-22 PROCEDURE — ? ADDITIONAL NOTES

## 2023-05-22 PROCEDURE — ? COSMETIC CONSULTATION: FILLERS

## 2023-05-22 PROCEDURE — ? BOTOX

## 2023-05-22 PROCEDURE — ? JUVEDERM ULTRA XC INJECTION

## 2023-05-22 ASSESSMENT — LOCATION SIMPLE DESCRIPTION DERM
LOCATION SIMPLE: LEFT LIP
LOCATION SIMPLE: RIGHT NOSE
LOCATION SIMPLE: RIGHT LIP
LOCATION SIMPLE: SUPERIOR FOREHEAD
LOCATION SIMPLE: RIGHT CHEEK
LOCATION SIMPLE: RIGHT FOREHEAD
LOCATION SIMPLE: LEFT CHEEK

## 2023-05-22 ASSESSMENT — LOCATION ZONE DERM
LOCATION ZONE: NOSE
LOCATION ZONE: FACE
LOCATION ZONE: LIP

## 2023-05-22 ASSESSMENT — LOCATION DETAILED DESCRIPTION DERM
LOCATION DETAILED: RIGHT NASAL ALAR GROOVE
LOCATION DETAILED: SUPERIOR MID FOREHEAD
LOCATION DETAILED: LEFT CENTRAL BUCCAL CHEEK
LOCATION DETAILED: LEFT SUPERIOR MEDIAL BUCCAL CHEEK
LOCATION DETAILED: RIGHT LOWER CUTANEOUS LIP
LOCATION DETAILED: LEFT MEDIAL BUCCAL CHEEK
LOCATION DETAILED: RIGHT SUPERIOR MEDIAL BUCCAL CHEEK
LOCATION DETAILED: RIGHT CENTRAL BUCCAL CHEEK
LOCATION DETAILED: RIGHT NASOLABIAL FOLD
LOCATION DETAILED: LEFT INFERIOR LATERAL BUCCAL CHEEK
LOCATION DETAILED: LEFT INFERIOR MEDIAL MALAR CHEEK
LOCATION DETAILED: LEFT LOWER CUTANEOUS LIP
LOCATION DETAILED: RIGHT MEDIAL BUCCAL CHEEK
LOCATION DETAILED: RIGHT INFERIOR MEDIAL MALAR CHEEK
LOCATION DETAILED: RIGHT INFERIOR LATERAL FOREHEAD

## 2023-05-22 NOTE — PROCEDURE: JUVEDERM VOLLURE XC INJECTION
Price (Use Numbers Only, No Special Characters Or $): 0464 Price (Use Numbers Only, No Special Characters Or $): 9706

## 2023-05-22 NOTE — PROCEDURE: JUVEDERM ULTRA XC INJECTION
Procedural Text: The filler was administered to the treatment areas noted above. Mod26, free of charge per EM

## 2023-05-22 NOTE — PROCEDURE: BOTOX
Periorbital Skin Units: 0
Show Nasal Units: Yes
Additional Area 2 Location: chin
Price (Use Numbers Only, No Special Characters Or $): 90
Incrementing Botox Units: By 0.5 Units
Show Ucl Units: No
Post-Care Instructions: Patient instructed to not lie down for 4 hours and limit physical activity for 24 hours. Patient instructed not to travel by airplane for 48 hours.
Consent: Written consent obtained. Risks include but not limited to lid/brow ptosis, bruising, swelling, diplopia, temporary effect, incomplete chemical denervation.
Additional Area 1 Location: lips
Dilution (U/0.1 Cc): 1.1
Detail Level: Zone
Forehead Units: 5

## 2023-05-22 NOTE — PROCEDURE: ADDITIONAL NOTES
Additional Notes: EM counsels that we can add 5 more units for the forehead.
Render Risk Assessment In Note?: no
Detail Level: Detailed
Additional Notes: EM counsels that we can add fillers to the cheeks to lift the face. She can also place  the nasolabial folds or marionette lines to smooth out the face.eM quotes 3 syr at 850$

## 2023-06-14 ENCOUNTER — APPOINTMENT (RX ONLY)
Dept: URBAN - METROPOLITAN AREA CLINIC 41 | Facility: CLINIC | Age: 63
Setting detail: DERMATOLOGY
End: 2023-06-14

## 2023-06-14 DIAGNOSIS — Z41.9 ENCOUNTER FOR PROCEDURE FOR PURPOSES OTHER THAN REMEDYING HEALTH STATE, UNSPECIFIED: ICD-10-CM

## 2023-06-14 PROCEDURE — ? VI PEEL

## 2023-06-14 NOTE — PROCEDURE: VI PEEL
Chemical Peel: VI Peel Precision Plus
Prep: The treated area was cleansed with VI Derm Gentle Purifying Cleanser and degreased with an acetone wipe. Then, Vaseline was applied for the protection of the mucus membranes.
Comments: 6 layers of the peel were applied. There was mild/moderate erythema in the cheeks, light erythema in the chin, and no erythema everywhere else. There was light excess heat in the chin and forehead,  mild/moderate excess heat in the cheeks, and no excess heat everywhere else. The patient said she felt a light tingling for the entire treatment. No frosting occurred.
Treatment Number: 4
Post Peel Care: After the procedure, the patient was instructed not to wash the treated area for 4 hours or manually remove dead skin when the peeling process starts. Patient may use OTC hydrocortisone cream for itching. Patient instructed to use the provided Retinol towelettes on the treated area until peeling begins.
Detail Level: Zone
Post-Care Instructions: I reviewed with the patient in detail post-care instructions. Patient should avoid sun exposure and wear sun protection. Pt should not do anything that will cause excess sweating, including but not limited to: working out, saunas, steam rooms, hot tubs, pools, hot showers, hot baths, etc. Pt should not pick at any peeling skin. I gave patient the post care products and instructed to only use those products for the next 14 days while skin is healing. I informed the patient that they can apply Vaseline/Aquaphor to eye area and if skin is extra dry. When drying the skin, patient should pat the skin dry, not rub.
Consent: Prior to the procedure, written consent was obtained and risks were reviewed, including but not limited to: redness, peeling, blistering, pigmentary change, scarring, infection, and pain. Patient is aware multiple treatments may be necessary to achieve the desired outcome.

## 2023-07-26 ENCOUNTER — APPOINTMENT (RX ONLY)
Dept: URBAN - METROPOLITAN AREA CLINIC 41 | Facility: CLINIC | Age: 63
Setting detail: DERMATOLOGY
End: 2023-07-26

## 2023-07-26 DIAGNOSIS — Z41.9 ENCOUNTER FOR PROCEDURE FOR PURPOSES OTHER THAN REMEDYING HEALTH STATE, UNSPECIFIED: ICD-10-CM

## 2023-07-26 PROCEDURE — ? HYDRAFACIAL

## 2023-07-26 NOTE — PROCEDURE: HYDRAFACIAL
Comments: There was mild erythema and light excess heat in the skin. Manual extractions were performed using cotton tipped applicators. \\n\\nProducts Used: Simply Clean Cleanser, Interfuse Face and Neck, Advanced Correction Cream, and RE Sheer Physical SPF 50

## 2023-07-26 NOTE — PROCEDURE: HYDRAFACIAL
Price (Use Numbers Only, No Special Characters Or $): 843 Price (Use Numbers Only, No Special Characters Or $): 844

## 2023-09-05 ENCOUNTER — APPOINTMENT (RX ONLY)
Dept: URBAN - METROPOLITAN AREA CLINIC 41 | Facility: CLINIC | Age: 63
Setting detail: DERMATOLOGY
End: 2023-09-05

## 2023-09-05 DIAGNOSIS — L82.1 OTHER SEBORRHEIC KERATOSIS: ICD-10-CM | Status: STABLE

## 2023-09-05 DIAGNOSIS — L57.8 OTHER SKIN CHANGES DUE TO CHRONIC EXPOSURE TO NONIONIZING RADIATION: ICD-10-CM | Status: STABLE

## 2023-09-05 DIAGNOSIS — M71 OTHER BURSOPATHIES: ICD-10-CM | Status: STABLE

## 2023-09-05 DIAGNOSIS — D18.0 HEMANGIOMA: ICD-10-CM | Status: STABLE

## 2023-09-05 DIAGNOSIS — L85.3 XEROSIS CUTIS: ICD-10-CM | Status: STABLE

## 2023-09-05 DIAGNOSIS — D22 MELANOCYTIC NEVI: ICD-10-CM | Status: STABLE

## 2023-09-05 PROBLEM — M71.341 OTHER BURSAL CYST, RIGHT HAND: Status: ACTIVE | Noted: 2023-09-05

## 2023-09-05 PROBLEM — D22.5 MELANOCYTIC NEVI OF TRUNK: Status: ACTIVE | Noted: 2023-09-05

## 2023-09-05 PROBLEM — D18.01 HEMANGIOMA OF SKIN AND SUBCUTANEOUS TISSUE: Status: ACTIVE | Noted: 2023-09-05

## 2023-09-05 PROCEDURE — 99213 OFFICE O/P EST LOW 20 MIN: CPT

## 2023-09-05 PROCEDURE — ? COUNSELING

## 2023-09-05 PROCEDURE — ? TREATMENT REGIMEN

## 2023-09-05 PROCEDURE — ? FULL BODY SKIN EXAM

## 2023-09-05 ASSESSMENT — LOCATION ZONE DERM
LOCATION ZONE: LEG
LOCATION ZONE: FINGER
LOCATION ZONE: TRUNK
LOCATION ZONE: FACE
LOCATION ZONE: ARM

## 2023-09-05 ASSESSMENT — LOCATION SIMPLE DESCRIPTION DERM
LOCATION SIMPLE: RIGHT SHOULDER
LOCATION SIMPLE: LEFT LOWER BACK
LOCATION SIMPLE: RIGHT UPPER BACK
LOCATION SIMPLE: RIGHT PRETIBIAL REGION
LOCATION SIMPLE: LEFT SHOULDER
LOCATION SIMPLE: LEFT PRETIBIAL REGION
LOCATION SIMPLE: UPPER BACK
LOCATION SIMPLE: RIGHT MIDDLE FINGER
LOCATION SIMPLE: RIGHT LOWER BACK
LOCATION SIMPLE: INFERIOR FOREHEAD

## 2023-09-05 ASSESSMENT — LOCATION DETAILED DESCRIPTION DERM
LOCATION DETAILED: LEFT DISTAL PRETIBIAL REGION
LOCATION DETAILED: RIGHT SUPERIOR MEDIAL MIDBACK
LOCATION DETAILED: LEFT INFERIOR MEDIAL MIDBACK
LOCATION DETAILED: RIGHT MID-UPPER BACK
LOCATION DETAILED: RIGHT PROXIMAL PRETIBIAL REGION
LOCATION DETAILED: LEFT POSTERIOR SHOULDER
LOCATION DETAILED: INFERIOR THORACIC SPINE
LOCATION DETAILED: INFERIOR MID FOREHEAD
LOCATION DETAILED: RIGHT MIDDLE FINGER PROXIMAL INTERPHALANGEAL JOINT
LOCATION DETAILED: RIGHT POSTERIOR SHOULDER

## 2023-09-05 NOTE — PROCEDURE: COUNSELING
Detail Level: Generalized
Sunscreen Recommendations: spf 30+
Detail Level: Zone
Detail Level: Detailed
Patient Specific Counseling (Will Not Stick From Patient To Patient): ==\\nBG recommends pt sees a hand specialist if they are concerned about cyst.

## 2023-09-05 NOTE — HPI: PSORIASIS (PATIENT REPORTED)
Where Is Your Psoriasis Located?: Arms
Additional History: \\n\\nEst pt, new to [facility]\\nFlares are reappearing on the arms\\nPt notes itchiness, dryness, and thickness \\nPt has not treated with medication, only moisturizer

## 2023-09-05 NOTE — HPI: EVALUATION OF SKIN LESION(S)
Hpi Title: Evaluation of Skin Lesions
Additional History: \\n\\nEst pt, new to [facility]\\nFBSE, last one was May 2022\\nArea of concern: bump on the right hand (pt notes there was oozing in the area)\\nNo personal hx of skin cancer

## 2023-10-03 ENCOUNTER — APPOINTMENT (RX ONLY)
Dept: URBAN - METROPOLITAN AREA CLINIC 41 | Facility: CLINIC | Age: 63
Setting detail: DERMATOLOGY
End: 2023-10-03

## 2023-10-03 DIAGNOSIS — Z41.9 ENCOUNTER FOR PROCEDURE FOR PURPOSES OTHER THAN REMEDYING HEALTH STATE, UNSPECIFIED: ICD-10-CM

## 2023-10-03 PROCEDURE — ? HYDRAFACIAL

## 2023-10-03 NOTE — PROCEDURE: HYDRAFACIAL
Price (Use Numbers Only, No Special Characters Or $): 011 Price (Use Numbers Only, No Special Characters Or $): 221

## 2023-10-03 NOTE — PROCEDURE: HYDRAFACIAL
Comments: Patient said she didn't feel any tingling with the peel. There was light- mild erythema in the skin and no excess heat. Manual extractions were performed using cotton tipped applicators, \\n\\nProducts Used: Simply Clean Cleanser, Interfuse Face and Neck, Advanced Correction Cream, and Sheer Physical SPF 50

## 2023-11-14 ENCOUNTER — APPOINTMENT (RX ONLY)
Dept: URBAN - METROPOLITAN AREA CLINIC 41 | Facility: CLINIC | Age: 63
Setting detail: DERMATOLOGY
End: 2023-11-14

## 2023-11-14 DIAGNOSIS — Z41.9 ENCOUNTER FOR PROCEDURE FOR PURPOSES OTHER THAN REMEDYING HEALTH STATE, UNSPECIFIED: ICD-10-CM

## 2023-11-14 PROCEDURE — ? DERMAPLANE

## 2023-11-14 PROCEDURE — ? HYDRAFACIAL

## 2023-11-14 ASSESSMENT — LOCATION ZONE DERM: LOCATION ZONE: FACE

## 2023-11-14 ASSESSMENT — LOCATION SIMPLE DESCRIPTION DERM: LOCATION SIMPLE: LEFT CHEEK

## 2023-11-14 ASSESSMENT — LOCATION DETAILED DESCRIPTION DERM: LOCATION DETAILED: LEFT CENTRAL MALAR CHEEK

## 2023-11-14 NOTE — PROCEDURE: DERMAPLANE
Comments: Skin was cleansed using Vital Activ Antioxidant Cleanser and degreased using an isopropyl alcohol wipe. Once dermaplane was complete, a HydraFacial Signature treatment was performed.
Treatment Areas: face
Treatment Area Prep: isopropyl alcohol
Price (Use Numbers Only, No Special Characters Or $): 90
Blade: 10R blade scalpel
Detail Level: Zone
Pre-Procedure Text: Verbal consent was obtained prior to treatment. \\nThe patient was placed in a recumbent position on the procedure table.
Post-Care Instructions: I reviewed with the patient in detail post-care instructions.

## 2023-11-14 NOTE — PROCEDURE: HYDRAFACIAL
Number Of Passes Step 3: 1
Tip Override
Vacuum Pressure Low Setting (Will Not Render If Set To 0): 14
Tip: Hydropeel Tip, Clear
Solution: Activ-4
Location: face
Number Of Passes Step 6: 0
Solution: GlySal 15%
Procedure: Extraction
Procedure: Boost
Solution Override
Comments: There was mild erythema and light excess heat in the skin. Manual blackhead extractions were performed using a comedone extractor, cotton tipped applicators, and a sterile needle. \\n\\nProducts Used: Vital Activ Antioxidant Cleanser, Calm and Correct Phyto Serum, Interfuse Face and Neck, Advanced Correction Cream, and RE Sheer Physical SPF 50
Consent: Written consent obtained, risks reviewed including but not limited to crusting, scabbing, blistering, scarring, darker or lighter pigmentary change, bruising, and/or incomplete response.
Procedure: Fusion
Vacuum Pressure Low Setting (Will Not Render If Set To 0): 19
Price (Use Numbers Only, No Special Characters Or $): 897
Tip: Hydropeel Tip, Blue
Procedure: Exfoliation
Tip: Hydropeel Tip, Teal
Indication: anti-aging
Solution Override: Antiox+
Number Of Passes Step 1: 2
Post-Care Instructions: I reviewed with the patient in detail post-care instructions. Patient should stay away from the sun and wear sun protection until treated areas are fully healed. Patient should avoid any exfoliating products and retinols for 1 week post procedure. Patient is aware this treatment can cause purging of the skin.
Solution Override: Beta Clear HD
Procedure: Peel

## 2024-01-09 ENCOUNTER — APPOINTMENT (RX ONLY)
Dept: URBAN - METROPOLITAN AREA CLINIC 41 | Facility: CLINIC | Age: 64
Setting detail: DERMATOLOGY
End: 2024-01-09

## 2024-01-09 DIAGNOSIS — Z41.9 ENCOUNTER FOR PROCEDURE FOR PURPOSES OTHER THAN REMEDYING HEALTH STATE, UNSPECIFIED: ICD-10-CM

## 2024-01-09 PROCEDURE — ? DERMAPLANE

## 2024-01-09 PROCEDURE — ? HYDRAFACIAL

## 2024-01-09 ASSESSMENT — LOCATION ZONE DERM: LOCATION ZONE: FACE

## 2024-01-09 ASSESSMENT — LOCATION DETAILED DESCRIPTION DERM: LOCATION DETAILED: LEFT CENTRAL MALAR CHEEK

## 2024-01-09 ASSESSMENT — LOCATION SIMPLE DESCRIPTION DERM: LOCATION SIMPLE: LEFT CHEEK

## 2024-01-09 NOTE — PROCEDURE: DERMAPLANE
Detail Level: Zone
Post-Care Instructions: I reviewed with the patient in detail post-care instructions.
Comments: Skin was cleansed using Vital Activ Antioxidant Cleanser and degreased using an isopropyl alcohol wipe. Once dermaplane was complete, a HydraFacial Deluxe treatment was performed.
Treatment Area Prep: isopropyl alcohol
Price (Use Numbers Only, No Special Characters Or $): 90
Blade: 10R blade scalpel
Treatment Areas: face
Pre-Procedure Text: Verbal consent was obtained prior to treatment. \\nThe patient was placed in a recumbent position on the procedure table.

## 2024-01-09 NOTE — PROCEDURE: HYDRAFACIAL
Indication: acne
Number Of Passes Step 1: 2
Number Of Passes Step 5: 1
Tip Override
Vacuum Pressure Low Setting (Will Not Render If Set To 0): 14
Solution Override: Beta Clear HD
Tip: Hydropeel Tip, Teal
Solution Override
Number Of Passes Step 6: 0
Procedure: Exfoliation
Procedure: Peel
Solution: Activ-4
Location: face
Tip: Hydropeel Tip, Clear
Consent: Written consent obtained, risks reviewed including but not limited to crusting, scabbing, blistering, scarring, darker or lighter pigmentary change, bruising, and/or incomplete response.
Comments: Patient said she didn't feel any tingling with the peel. There was mild erythema and little to no excess heat in the skin. Manual blackhead extractions were performed using a comedone extractor and cotton tipped applicators. \\n\\nProducts Used: Vital Activ Antioxidant Cleanser, Calm and Correct Phyto Serum, Hydrating Barrier Repair, and Sheer Physical SPF 50.
Solution Override: Antiox+
Procedure: Extraction
Solution: GlySal 7.5%
Procedure: Boost
Post-Care Instructions: I reviewed with the patient in detail post-care instructions. Patient should stay away from the sun and wear sun protection until treated areas are fully healed. Patient should avoid any exfoliating products and retinols for 1 week post procedure. Patient is aware this treatment can cause purging of the skin.
Vacuum Pressure Low Setting (Will Not Render If Set To 0): 19
Price (Use Numbers Only, No Special Characters Or $): 337
Procedure: Fusion
Tip: Hydropeel Tip, Blue

## 2024-05-29 NOTE — PROCEDURE: JUVEDERM VOLLURE XC INJECTION
Filled out paperwork daughter brought today. Do we have a fax number. Nevin is planning on faxing, but not sure if you have it on hand Post-Care Instructions: Patient instructed to apply ice to reduce swelling.